# Patient Record
Sex: FEMALE | Race: WHITE | Employment: UNEMPLOYED | URBAN - METROPOLITAN AREA
[De-identification: names, ages, dates, MRNs, and addresses within clinical notes are randomized per-mention and may not be internally consistent; named-entity substitution may affect disease eponyms.]

---

## 2016-07-20 LAB
ANTIBODY SCREEN, EXTERNAL: NEGATIVE
CHLAMYDIA, EXTERNAL: NEGATIVE
HBSAG, EXTERNAL: NEGATIVE
HIV, EXTERNAL: NEGATIVE
N. GONORRHEA, EXTERNAL: NEGATIVE
RPR, EXTERNAL: NORMAL
RUBELLA, EXTERNAL: NORMAL
TYPE, ABO & RH, EXTERNAL: NORMAL

## 2017-01-31 LAB — GRBS, EXTERNAL: NEGATIVE

## 2017-02-26 ENCOUNTER — ANESTHESIA (OUTPATIENT)
Dept: LABOR AND DELIVERY | Age: 27
DRG: 560 | End: 2017-02-26
Payer: MEDICAID

## 2017-02-26 ENCOUNTER — HOSPITAL ENCOUNTER (INPATIENT)
Age: 27
LOS: 3 days | Discharge: HOME | DRG: 560 | End: 2017-03-01
Attending: OBSTETRICS & GYNECOLOGY | Admitting: OBSTETRICS & GYNECOLOGY
Payer: MEDICAID

## 2017-02-26 ENCOUNTER — ANESTHESIA EVENT (OUTPATIENT)
Dept: LABOR AND DELIVERY | Age: 27
DRG: 560 | End: 2017-02-26
Payer: MEDICAID

## 2017-02-26 PROBLEM — Z33.1 IUP (INTRAUTERINE PREGNANCY), INCIDENTAL: Status: ACTIVE | Noted: 2017-02-26

## 2017-02-26 LAB
ALBUMIN SERPL BCP-MCNC: 2 G/DL (ref 3.4–5)
ALBUMIN SERPL BCP-MCNC: 2.1 G/DL (ref 3.4–5)
ALBUMIN/GLOB SERPL: 0.5 {RATIO} (ref 0.8–1.7)
ALBUMIN/GLOB SERPL: 0.6 {RATIO} (ref 0.8–1.7)
ALP SERPL-CCNC: 235 U/L (ref 45–117)
ALP SERPL-CCNC: 238 U/L (ref 45–117)
ALT SERPL-CCNC: 36 U/L (ref 13–56)
ALT SERPL-CCNC: 37 U/L (ref 13–56)
ANION GAP BLD CALC-SCNC: 14 MMOL/L (ref 3–18)
ANION GAP BLD CALC-SCNC: 16 MMOL/L (ref 3–18)
APPEARANCE UR: ABNORMAL
AST SERPL W P-5'-P-CCNC: 42 U/L (ref 15–37)
AST SERPL W P-5'-P-CCNC: 42 U/L (ref 15–37)
BILIRUB SERPL-MCNC: 0.2 MG/DL (ref 0.2–1)
BILIRUB SERPL-MCNC: 0.2 MG/DL (ref 0.2–1)
BILIRUB UR QL: NEGATIVE
BUN SERPL-MCNC: 27 MG/DL (ref 7–18)
BUN SERPL-MCNC: 27 MG/DL (ref 7–18)
BUN/CREAT SERPL: 13 (ref 12–20)
BUN/CREAT SERPL: 13 (ref 12–20)
CALCIUM SERPL-MCNC: 8.8 MG/DL (ref 8.5–10.1)
CALCIUM SERPL-MCNC: 9.5 MG/DL (ref 8.5–10.1)
CHLORIDE SERPL-SCNC: 106 MMOL/L (ref 100–108)
CHLORIDE SERPL-SCNC: 107 MMOL/L (ref 100–108)
CO2 SERPL-SCNC: 17 MMOL/L (ref 21–32)
CO2 SERPL-SCNC: 18 MMOL/L (ref 21–32)
COLOR UR: YELLOW
CREAT SERPL-MCNC: 2.09 MG/DL (ref 0.6–1.3)
CREAT SERPL-MCNC: 2.11 MG/DL (ref 0.6–1.3)
ERYTHROCYTE [DISTWIDTH] IN BLOOD BY AUTOMATED COUNT: 13.5 % (ref 11.6–14.5)
ERYTHROCYTE [DISTWIDTH] IN BLOOD BY AUTOMATED COUNT: 13.6 % (ref 11.6–14.5)
GLOBULIN SER CALC-MCNC: 3.8 G/DL (ref 2–4)
GLOBULIN SER CALC-MCNC: 3.9 G/DL (ref 2–4)
GLUCOSE SERPL-MCNC: 64 MG/DL (ref 74–99)
GLUCOSE SERPL-MCNC: 73 MG/DL (ref 74–99)
GLUCOSE UR QL STRIP.AUTO: NEGATIVE MG/DL
HCT VFR BLD AUTO: 26.1 % (ref 35–45)
HCT VFR BLD AUTO: 26.4 % (ref 35–45)
HGB BLD-MCNC: 8.9 G/DL (ref 12–16)
HGB BLD-MCNC: 8.9 G/DL (ref 12–16)
KETONES UR-MCNC: NEGATIVE MG/DL
LDH SERPL L TO P-CCNC: 156 U/L (ref 81–234)
LDH SERPL L TO P-CCNC: 160 U/L (ref 81–234)
LEUKOCYTE ESTERASE UR QL STRIP: ABNORMAL
MCH RBC QN AUTO: 31 PG (ref 24–34)
MCH RBC QN AUTO: 31 PG (ref 24–34)
MCHC RBC AUTO-ENTMCNC: 33.7 G/DL (ref 31–37)
MCHC RBC AUTO-ENTMCNC: 34.1 G/DL (ref 31–37)
MCV RBC AUTO: 90.9 FL (ref 74–97)
MCV RBC AUTO: 92 FL (ref 74–97)
NITRITE UR QL: NEGATIVE
PH UR: 6 [PH] (ref 5–8)
PLATELET # BLD AUTO: 251 K/UL (ref 135–420)
PLATELET # BLD AUTO: 251 K/UL (ref 135–420)
PMV BLD AUTO: 10.2 FL (ref 9.2–11.8)
PMV BLD AUTO: 10.4 FL (ref 9.2–11.8)
POTASSIUM SERPL-SCNC: 3.1 MMOL/L (ref 3.5–5.5)
POTASSIUM SERPL-SCNC: 3.3 MMOL/L (ref 3.5–5.5)
PROT SERPL-MCNC: 5.9 G/DL (ref 6.4–8.2)
PROT SERPL-MCNC: 5.9 G/DL (ref 6.4–8.2)
PROT UR QL: 30 MG/DL
RBC # BLD AUTO: 2.87 M/UL (ref 4.2–5.3)
RBC # BLD AUTO: 2.87 M/UL (ref 4.2–5.3)
RBC # UR STRIP: ABNORMAL /UL
SODIUM SERPL-SCNC: 139 MMOL/L (ref 136–145)
SODIUM SERPL-SCNC: 139 MMOL/L (ref 136–145)
SP GR UR: 1.01 (ref 1–1.03)
URATE SERPL-MCNC: 8.1 MG/DL (ref 2.6–7.2)
URATE SERPL-MCNC: 8.4 MG/DL (ref 2.6–7.2)
UROBILINOGEN UR QL: 0.2 EU/DL (ref 0.2–1)
WBC # BLD AUTO: 13.1 K/UL (ref 4.6–13.2)
WBC # BLD AUTO: 13.4 K/UL (ref 4.6–13.2)

## 2017-02-26 PROCEDURE — 83615 LACTATE (LD) (LDH) ENZYME: CPT

## 2017-02-26 PROCEDURE — 74011000250 HC RX REV CODE- 250

## 2017-02-26 PROCEDURE — 65270000029 HC RM PRIVATE

## 2017-02-26 PROCEDURE — 75410000002 HC LABOR FEE PER 1 HR

## 2017-02-26 PROCEDURE — 36415 COLL VENOUS BLD VENIPUNCTURE: CPT

## 2017-02-26 PROCEDURE — 77030007879 HC KT SPN EPDRL TELE -B: Performed by: ANESTHESIOLOGY

## 2017-02-26 PROCEDURE — 00HU33Z INSERTION OF INFUSION DEVICE INTO SPINAL CANAL, PERCUTANEOUS APPROACH: ICD-10-PCS | Performed by: ANESTHESIOLOGY

## 2017-02-26 PROCEDURE — 85027 COMPLETE CBC AUTOMATED: CPT

## 2017-02-26 PROCEDURE — 84550 ASSAY OF BLOOD/URIC ACID: CPT

## 2017-02-26 PROCEDURE — 80053 COMPREHEN METABOLIC PANEL: CPT

## 2017-02-26 PROCEDURE — 74011250636 HC RX REV CODE- 250/636: Performed by: ADVANCED PRACTICE MIDWIFE

## 2017-02-26 PROCEDURE — 74011000250 HC RX REV CODE- 250: Performed by: ADVANCED PRACTICE MIDWIFE

## 2017-02-26 PROCEDURE — 74011250636 HC RX REV CODE- 250/636

## 2017-02-26 PROCEDURE — 74011250636 HC RX REV CODE- 250/636: Performed by: ANESTHESIOLOGY

## 2017-02-26 PROCEDURE — 77030010547 HC BG URIN W/UMETER -A

## 2017-02-26 PROCEDURE — 81003 URINALYSIS AUTO W/O SCOPE: CPT

## 2017-02-26 PROCEDURE — 77030034849

## 2017-02-26 RX ORDER — MAGNESIUM SULFATE HEPTAHYDRATE 40 MG/ML
2 INJECTION, SOLUTION INTRAVENOUS
Status: DISCONTINUED | OUTPATIENT
Start: 2017-02-26 | End: 2017-02-26

## 2017-02-26 RX ORDER — FOLIC ACID 1 MG/1
TABLET ORAL DAILY
COMMUNITY

## 2017-02-26 RX ORDER — CALCIUM GLUCONATE 94 MG/ML
1 INJECTION, SOLUTION INTRAVENOUS AS NEEDED
Status: DISCONTINUED | OUTPATIENT
Start: 2017-02-26 | End: 2017-03-01 | Stop reason: HOSPADM

## 2017-02-26 RX ORDER — SODIUM CHLORIDE 0.9 % (FLUSH) 0.9 %
5-10 SYRINGE (ML) INJECTION AS NEEDED
Status: DISCONTINUED | OUTPATIENT
Start: 2017-02-26 | End: 2017-02-27

## 2017-02-26 RX ORDER — ACETAMINOPHEN 10 MG/ML
1000 INJECTION, SOLUTION INTRAVENOUS ONCE
Status: COMPLETED | OUTPATIENT
Start: 2017-02-26 | End: 2017-02-26

## 2017-02-26 RX ORDER — NALOXONE HYDROCHLORIDE 0.4 MG/ML
0.2 INJECTION, SOLUTION INTRAMUSCULAR; INTRAVENOUS; SUBCUTANEOUS AS NEEDED
Status: DISCONTINUED | OUTPATIENT
Start: 2017-02-26 | End: 2017-02-27

## 2017-02-26 RX ORDER — FENTANYL CITRATE 50 UG/ML
INJECTION, SOLUTION INTRAMUSCULAR; INTRAVENOUS
Status: DISCONTINUED
Start: 2017-02-26 | End: 2017-02-27

## 2017-02-26 RX ORDER — SODIUM CHLORIDE 0.9 % (FLUSH) 0.9 %
5-10 SYRINGE (ML) INJECTION EVERY 8 HOURS
Status: DISCONTINUED | OUTPATIENT
Start: 2017-02-26 | End: 2017-02-27

## 2017-02-26 RX ORDER — OXYTOCIN/RINGER'S LACTATE 20/1000 ML
125 PLASTIC BAG, INJECTION (ML) INTRAVENOUS CONTINUOUS
Status: DISCONTINUED | OUTPATIENT
Start: 2017-02-26 | End: 2017-02-27

## 2017-02-26 RX ORDER — ROPIVACAINE HYDROCHLORIDE 2 MG/ML
INJECTION, SOLUTION EPIDURAL; INFILTRATION; PERINEURAL AS NEEDED
Status: DISCONTINUED | OUTPATIENT
Start: 2017-02-26 | End: 2017-02-27 | Stop reason: HOSPADM

## 2017-02-26 RX ORDER — FAMOTIDINE 20 MG/1
20 TABLET, FILM COATED ORAL 2 TIMES DAILY
COMMUNITY

## 2017-02-26 RX ORDER — NALBUPHINE HYDROCHLORIDE 10 MG/ML
10 INJECTION, SOLUTION INTRAMUSCULAR; INTRAVENOUS; SUBCUTANEOUS
Status: DISCONTINUED | OUTPATIENT
Start: 2017-02-26 | End: 2017-02-27

## 2017-02-26 RX ORDER — METHYLERGONOVINE MALEATE 0.2 MG/ML
0.2 INJECTION INTRAVENOUS AS NEEDED
Status: DISCONTINUED | OUTPATIENT
Start: 2017-02-26 | End: 2017-02-27

## 2017-02-26 RX ORDER — HYDROMORPHONE HYDROCHLORIDE 1 MG/ML
1 INJECTION, SOLUTION INTRAMUSCULAR; INTRAVENOUS; SUBCUTANEOUS
Status: DISCONTINUED | OUTPATIENT
Start: 2017-02-26 | End: 2017-02-27

## 2017-02-26 RX ORDER — LIDOCAINE HYDROCHLORIDE 20 MG/ML
INJECTION, SOLUTION EPIDURAL; INFILTRATION; INTRACAUDAL; PERINEURAL AS NEEDED
Status: DISCONTINUED | OUTPATIENT
Start: 2017-02-26 | End: 2017-02-27 | Stop reason: HOSPADM

## 2017-02-26 RX ORDER — BUTORPHANOL TARTRATE 2 MG/ML
2 INJECTION INTRAMUSCULAR; INTRAVENOUS
Status: DISCONTINUED | OUTPATIENT
Start: 2017-02-26 | End: 2017-02-27

## 2017-02-26 RX ORDER — LIDOCAINE HYDROCHLORIDE 10 MG/ML
20 INJECTION, SOLUTION EPIDURAL; INFILTRATION; INTRACAUDAL; PERINEURAL AS NEEDED
Status: DISCONTINUED | OUTPATIENT
Start: 2017-02-26 | End: 2017-02-27

## 2017-02-26 RX ORDER — FENTANYL/ROPIVACAINE/NS/PF 2MCG/ML-.1
1-15 PLASTIC BAG, INJECTION (ML) EPIDURAL
Status: DISCONTINUED | OUTPATIENT
Start: 2017-02-26 | End: 2017-02-27

## 2017-02-26 RX ORDER — NALBUPHINE HYDROCHLORIDE 10 MG/ML
5 INJECTION, SOLUTION INTRAMUSCULAR; INTRAVENOUS; SUBCUTANEOUS ONCE
Status: COMPLETED | OUTPATIENT
Start: 2017-02-27 | End: 2017-02-26

## 2017-02-26 RX ORDER — MINERAL OIL
30 OIL (ML) ORAL AS NEEDED
Status: COMPLETED | OUTPATIENT
Start: 2017-02-26 | End: 2017-02-27

## 2017-02-26 RX ORDER — TERBUTALINE SULFATE 1 MG/ML
0.25 INJECTION SUBCUTANEOUS
Status: DISCONTINUED | OUTPATIENT
Start: 2017-02-26 | End: 2017-02-27

## 2017-02-26 RX ORDER — DIPHENHYDRAMINE HYDROCHLORIDE 50 MG/ML
12.5 INJECTION, SOLUTION INTRAMUSCULAR; INTRAVENOUS
Status: DISCONTINUED | OUTPATIENT
Start: 2017-02-26 | End: 2017-03-01 | Stop reason: HOSPADM

## 2017-02-26 RX ORDER — MAGNESIUM SULFATE HEPTAHYDRATE 40 MG/ML
2 INJECTION, SOLUTION INTRAVENOUS
Status: COMPLETED | OUTPATIENT
Start: 2017-02-26 | End: 2017-02-27

## 2017-02-26 RX ORDER — OXYTOCIN IN 5 % DEXTROSE 30/500 ML
.5-2 PLASTIC BAG, INJECTION (ML) INTRAVENOUS
Status: DISCONTINUED | OUTPATIENT
Start: 2017-02-26 | End: 2017-02-27

## 2017-02-26 RX ORDER — LIDOCAINE HYDROCHLORIDE AND EPINEPHRINE 15; 5 MG/ML; UG/ML
INJECTION, SOLUTION EPIDURAL AS NEEDED
Status: DISCONTINUED | OUTPATIENT
Start: 2017-02-26 | End: 2017-02-27 | Stop reason: HOSPADM

## 2017-02-26 RX ORDER — FENTANYL CITRATE 50 UG/ML
100 INJECTION, SOLUTION INTRAMUSCULAR; INTRAVENOUS ONCE
Status: DISCONTINUED | OUTPATIENT
Start: 2017-02-26 | End: 2017-02-27

## 2017-02-26 RX ORDER — LAMOTRIGINE 100 MG/1
100 TABLET ORAL DAILY
COMMUNITY

## 2017-02-26 RX ORDER — FENTANYL/ROPIVACAINE/NS/PF 2MCG/ML-.1
PLASTIC BAG, INJECTION (ML) EPIDURAL
Status: DISCONTINUED
Start: 2017-02-26 | End: 2017-02-27

## 2017-02-26 RX ORDER — SODIUM CHLORIDE, SODIUM LACTATE, POTASSIUM CHLORIDE, CALCIUM CHLORIDE 600; 310; 30; 20 MG/100ML; MG/100ML; MG/100ML; MG/100ML
75 INJECTION, SOLUTION INTRAVENOUS CONTINUOUS
Status: DISCONTINUED | OUTPATIENT
Start: 2017-02-26 | End: 2017-02-28 | Stop reason: HOSPADM

## 2017-02-26 RX ORDER — FENTANYL CITRATE 50 UG/ML
INJECTION, SOLUTION INTRAMUSCULAR; INTRAVENOUS AS NEEDED
Status: DISCONTINUED | OUTPATIENT
Start: 2017-02-26 | End: 2017-02-27 | Stop reason: HOSPADM

## 2017-02-26 RX ORDER — LAMOTRIGINE 200 MG/1
TABLET ORAL 2 TIMES DAILY
COMMUNITY

## 2017-02-26 RX ORDER — OXYTOCIN/RINGER'S LACTATE 20/1000 ML
500 PLASTIC BAG, INJECTION (ML) INTRAVENOUS ONCE
Status: ACTIVE | OUTPATIENT
Start: 2017-02-26 | End: 2017-02-27

## 2017-02-26 RX ADMIN — FAMOTIDINE 20 MG: 10 INJECTION, SOLUTION INTRAVENOUS at 20:05

## 2017-02-26 RX ADMIN — SODIUM CHLORIDE, SODIUM LACTATE, POTASSIUM CHLORIDE, AND CALCIUM CHLORIDE 125 ML/HR: 600; 310; 30; 20 INJECTION, SOLUTION INTRAVENOUS at 19:40

## 2017-02-26 RX ADMIN — FENTANYL CITRATE 100 MCG: 50 INJECTION, SOLUTION INTRAMUSCULAR; INTRAVENOUS at 22:02

## 2017-02-26 RX ADMIN — Medication 2 MILLI-UNITS/MIN: at 20:39

## 2017-02-26 RX ADMIN — LIDOCAINE HYDROCHLORIDE AND EPINEPHRINE 2 ML: 15; 5 INJECTION, SOLUTION EPIDURAL at 22:02

## 2017-02-26 RX ADMIN — SODIUM CHLORIDE, SODIUM LACTATE, POTASSIUM CHLORIDE, AND CALCIUM CHLORIDE 1000 ML: 600; 310; 30; 20 INJECTION, SOLUTION INTRAVENOUS at 21:10

## 2017-02-26 RX ADMIN — LIDOCAINE HYDROCHLORIDE 5 ML: 20 INJECTION, SOLUTION EPIDURAL; INFILTRATION; INTRACAUDAL; PERINEURAL at 21:59

## 2017-02-26 RX ADMIN — NALBUPHINE HYDROCHLORIDE 5 MG: 10 INJECTION, SOLUTION INTRAMUSCULAR; INTRAVENOUS; SUBCUTANEOUS at 23:49

## 2017-02-26 RX ADMIN — SODIUM CHLORIDE, SODIUM LACTATE, POTASSIUM CHLORIDE, AND CALCIUM CHLORIDE 75 ML/HR: 600; 310; 30; 20 INJECTION, SOLUTION INTRAVENOUS at 23:24

## 2017-02-26 RX ADMIN — Medication 2 G/HR: at 23:24

## 2017-02-26 RX ADMIN — SODIUM CHLORIDE, SODIUM LACTATE, POTASSIUM CHLORIDE, AND CALCIUM CHLORIDE 125 ML/HR: 600; 310; 30; 20 INJECTION, SOLUTION INTRAVENOUS at 22:18

## 2017-02-26 RX ADMIN — ACETAMINOPHEN 1000 MG: 10 INJECTION, SOLUTION INTRAVENOUS at 20:47

## 2017-02-26 RX ADMIN — ROPIVACAINE HYDROCHLORIDE 5 ML: 2 INJECTION, SOLUTION EPIDURAL; INFILTRATION; PERINEURAL at 22:02

## 2017-02-26 RX ADMIN — Medication 12 ML/HR: at 22:19

## 2017-02-26 RX ADMIN — DIPHENHYDRAMINE HYDROCHLORIDE 12.5 MG: 50 INJECTION, SOLUTION INTRAMUSCULAR; INTRAVENOUS at 22:56

## 2017-02-26 RX ADMIN — MAGNESIUM SULFATE HEPTAHYDRATE 2 G: 40 INJECTION, SOLUTION INTRAVENOUS at 23:11

## 2017-02-26 RX ADMIN — MAGNESIUM SULFATE HEPTAHYDRATE 2 G: 40 INJECTION, SOLUTION INTRAVENOUS at 23:01

## 2017-02-26 RX ADMIN — LIDOCAINE HYDROCHLORIDE AND EPINEPHRINE 3 ML: 15; 5 INJECTION, SOLUTION EPIDURAL at 22:01

## 2017-02-26 NOTE — ROUTINE PROCESS
Patient states she has not been able to eat over the past 2 days due to pain that she describes as sharp and constant rating it at 8/10 over the past 2 days that is in her mid chest.  Patient states she has a history of heart burn/ indigestion and has tried pepcid and tums with no relief. Patient states her symptoms worsen with eating and even trying to drink. ANOOP Penn given report of patient's complaints, JULIA reviewed strip and vital signs and ordered labs. 1830:  ANOOP Penn at bedside assessing patient. SVE per JULIA 5-6/90/0.      1900:  ANOOP Villegas CNM reviewed labs and order received to admit patient. 1920: Bedside and Verbal shift change report given to Marcy Levine RN (oncoming nurse) by Olvin Ochoa RN   (offgoing nurse). Report included the following information SBAR, MAR and Recent Results. Alarm parameters reviewed and opportunities for questions provided.

## 2017-02-26 NOTE — IP AVS SNAPSHOT
Connor Davidson 
 
 
 [address] [facility] [address] 
[phone] Patient: Emerson Pritchett MRN: [id number] :1990 You are allergic to the following No active allergies Recent Documentation Height  
  
  
  
  
  
 1.676 m Emergency Contacts Name Discharge Info Relation Home Work Mobile Iram Cano N/A  AT THIS TIME [6] About your hospitalization You were admitted on:  2017 You last received care in the:  [address] [place] [address] You were discharged on:  2017 Unit phone number:  [phone] Why you were hospitalized Your primary diagnosis was:  Not on File Your diagnoses also included:  Iup (Intrauterine Pregnancy), Incidental  
  
  
 
  
  
Providers Seen During Your Hospitalizations Provider Role Specialty Primary office phone Temitope Case MD Attending Provider Obstetrics & Gynecology [phone] Your Primary Care Physician (PCP) Primary Care Physician Office Phone Office Fax [facility] [phone] Follow-up Information Follow up With Details Comments Contact Info Bhavana Tomlinson,  [address] 
[phone] Current Discharge Medication List  
  
START taking these medications Dose & Instructions Dispensing Information Comments Morning Noon Evening Bedtime  
 ibuprofen 800 mg tablet Commonly known as:  MOTRIN Your next dose is: Today, Tomorrow Other:  _________ Dose:  800 mg Take 1 Tab by mouth every eight (8) hours as needed. Quantity:  40 Tab Refills:  0 CONTINUE these medications which have NOT CHANGED Dose & Instructions Dispensing Information Comments Morning Noon Evening Bedtime  
 folic acid 1 mg tablet Commonly known as:  [employer]  
   
 Your next dose is: Today, Tomorrow Other:  _________ Take  by mouth daily. Refills:  0  
     
   
   
   
  
 * LaMICtal 100 mg tablet Generic drug:  lamoTRIgine Your next dose is: Today, Tomorrow Other:  _________ Dose:  100 mg Take 100 mg by mouth daily. Refills:  0  
     
   
   
   
  
 * LaMICtal 200 mg tablet Generic drug:  lamoTRIgine Your next dose is: Today, Tomorrow Other:  _________ Take  by mouth two (2) times a day. Refills:  0 PEPCID 20 mg tablet Generic drug:  famotidine Your next dose is: Today, Tomorrow Other:  _________ Dose:  20 mg Take 20 mg by mouth two (2) times a day. Indications: HEARTBURN Refills:  0  
     
   
   
   
  
 * Notice: This list has 2 medication(s) that are the same as other medications prescribed for you. Read the directions carefully, and ask your doctor or other care provider to review them with you. Where to Get Your Medications Information on where to get these meds will be given to you by the nurse or doctor. ! Ask your nurse or doctor about these medications  
  ibuprofen 800 mg tablet Discharge Instructions POST DELIVERY DISCHARGE INSTRUCTIONS Name: Maximilian Cash YOB: 1990 Primary Diagnosis: Active Problems: 
  IUP (intrauterine pregnancy), incidental (2/26/2017) General:  
 
Diet/Diet Restrictions: 
Eight 8-ounce glasses of fluid daily (water, juices); avoid excessive caffeine intake. Meals/snacks as desired which are high in fiber and carbohydrates and low in fat and cholesterol. Physical Activity / Restrictions / Safety:  
 
Avoid heavy lifting, no more that 8 lbs. For 2-3 weeks; No driving for one week. Avoid intercourse 4-6 weeks, no douching or tampon use.  Check with obstetrician before starting or resuming an exercise program.    
 
 
 Discharge Instructions/Special Treatment/Home Care Needs:  
 
Continue prenatal vitamins. Continue to use squirt bottle with warm water on your episiotomy after each bathroom use until bleeding stops. Call your doctor for the following:  
 
Fever over 101 degrees by mouth. Vaginal bleeding heavier than a normal menstrual period or clot larger than a golf ball. Red streaks or increased swelling of legs, painful red streaks on your breast. 
Painful urination, constipation and increased pain or swelling or discharge with your incision. If you feel extremely anxious or overwhelmed. If you have thoughts of harming yourself and/or your baby. Pain Management:  
 
Pain Management:  
Take Acetaminophen (Tylenol) or Ibuprofen (Advil, Motrin), as directed for pain. Use a warm Sitz bath 3 times daily to relieve episiotomy or hemorrhoidal discomfort. Heating pad to  incision as needed. For hemorrhoidal discomfort, use Tucks and Anusol cream as needed and directed. Follow-Up Care:  
 
Call [facility]. to make appointment for follow up in 3-5 days. These are general instructions for a healthy lifestyle: No smoking/ No tobacco products/ Avoid exposure to second hand smoke Surgeon General's Warning:  Quitting smoking now greatly reduces serious risk to your health. Obesity, smoking, and sedentary lifestyle greatly increases your risk for illness A healthy diet, regular physical exercise & weight monitoring are important for maintaining a healthy lifestyle Recognize signs and symptoms of STROKE: 
 
F-face looks uneven A-arms unable to move or move unevenly S-speech slurred or non-existent T-time-call 911 as soon as signs and symptoms begin-DO NOT go Back to bed or wait to see if you get better-TIME IS BRAIN. 
 
 
 
***PLEASE CALL YOUR PHYSICIAN TO SCHEDULE YOUR FOLLOW UP APPOINTMENT*** MyChart Activation Thank you for requesting access to CRMnext. Please follow the instructions below to securely access and download your online medical record. CRMnext allows you to send messages to your doctor, view your test results, renew your prescriptions, schedule appointments, and more. How Do I Sign Up? 1. In your internet browser, go to [email or web address]. 2. Click on the First Time User? Click Here link in the Sign In box. You will see the New Member Sign Up page. 3. Enter your CRMnext Access Code exactly as it appears below. You will not need to use this code after youve completed the sign-up process. If you do not sign up before the expiration date, you must request a new code. CRMnext Access Code: [email or web address] Expires: 2017  7:30 PM (This is the date your CRMnext access code will ) 4. Enter the last four digits of your Social Security Number (xxxx) and Date of Birth (mm/dd/yyyy) as indicated and click Submit. You will be taken to the next sign-up page. 5. Create a CRMnext ID. This will be your CRMnext login ID and cannot be changed, so think of one that is secure and easy to remember. 6. Create a CRMnext password. You can change your password at any time. 7. Enter your Password Reset Question and Answer. This can be used at a later time if you forget your password. 8. Enter your e-mail address. You will receive e-mail notification when new information is available in [address] 9. Click Sign Up. You can now view and download portions of your medical record. 10. Click the Download Summary menu link to download a portable copy of your medical information. Additional Information If you have questions, please visit the Frequently Asked Questions section of the CRMnext website at [email or web address] Remember, CRMnext is NOT to be used for urgent needs. For medical emergencies, dial 911. Discharge Instructions Attachments/References PREECLAMPSIA: POSTPARTUM: GENERAL INFO (ENGLISH) PARENTING: STRESS AND INFANTS (ENGLISH) POSTPARTUM CARE (ENGLISH) BREASTFEEDING (ENGLISH) FEEDING: : PEDIATRIC (ENGLISH) Discharge Orders None [facility] Announcement We are excited to announce that we are making your provider's discharge notes available to you in Bioclones. You will see these notes when they are completed and signed by the physician that discharged you from your recent hospital stay. If you have any questions or concerns about any information you see in Bioclones, please call the Health Information Department where you were seen or reach out to your Primary Care Provider for more information about your plan of care. Introducing [facility]! Kayla Brunson introduces Bioclones patient portal. Now you can access parts of your medical record, email your doctor's office, and request medication refills online. 1. In your internet browser, go to [email or web address] 2. Click on the First Time User? Click Here link in the Sign In box. You will see the New Member Sign Up page. 3. Enter your Bioclones Access Code exactly as it appears below. You will not need to use this code after youve completed the sign-up process. If you do not sign up before the expiration date, you must request a new code. · Bioclones Access Code: [email or web address] Expires: 2017  7:30 PM 
 
4. Enter the last four digits of your Social Security Number (xxxx) and Date of Birth (mm/dd/yyyy) as indicated and click Submit. You will be taken to the next sign-up page. 5. Create a Bioclones ID. This will be your Bioclones login ID and cannot be changed, so think of one that is secure and easy to remember. 6. Create a Bioclones password. You can change your password at any time. 7. Enter your Password Reset Question and Answer. This can be used at a later time if you forget your password. 8. Enter your e-mail address. You will receive e-mail notification when new information is available in [address] 9. Click Sign Up. You can now view and download portions of your medical record. 10. Click the Download Summary menu link to download a portable copy of your medical information. If you have questions, please visit the Frequently Asked Questions section of the Eventials website. Remember, Eventials is NOT to be used for urgent needs. For medical emergencies, dial 911. Now available from your iPhone and Android! General Information Please provide this summary of care documentation to your next provider. Patient Signature:  ____________________________________________________________ Date:  ____________________________________________________________  
  
Christopher Kimball Provider Signature:  ____________________________________________________________ Date:  ____________________________________________________________ More Information Learning About Preeclampsia After Childbirth What is preeclampsia? A woman with preeclampsia has blood pressure that is higher than usual. She may also have other serious symptoms. Preeclampsia can be dangerous. When it is severe, it can cause seizures (eclampsia) or liver or kidney damage. When the liver is affected, some women get HELLP syndrome, a blood-clotting and bleeding problem. HELLP can come on quickly and can be deadly. This is why your doctor checks you and your baby often. Preeclampsia usually occurs after 20 weeks of pregnancy. In rare cases, it is first noted right after childbirth. Most often, it starts near the end of pregnancy and goes away after childbirth. What are the symptoms? Mild preeclampsia usually doesn't cause symptoms. But preeclampsia can cause rapid weight gain and sudden swelling of the hands and face. Severe preeclampsia does cause symptoms. It can cause a very bad headache and trouble seeing and breathing. It also can cause belly pain. You may also urinate less than usual. 
If you have new preeclampsia symptoms after you go home from the hospital, call your doctor right away. What can you expect after you have had preeclampsia? In the hospital 
After the baby and the placenta are delivered, preeclampsia usually starts to improve. Most women get better in the first few days after childbirth. After having preeclampsia, you still have a risk of seizures for a day or more after childbirth. (Very rarely, seizures happen later on.) So your doctor may have you take magnesium sulfate for a day or more to prevent seizures. You may also take medicine to lower your blood pressure. When you go home Your blood pressure will most likely return to normal a few days after delivery. Your doctor will want to check your blood pressure sometime in the first week after you leave the hospital. 
Some women still have high blood pressure 6 weeks after childbirth. But most return to normal levels over the long term. · Take and record your blood pressure at home if your doctor tells you to. ¨ Learn the importance of the two measures of blood pressure (such as 120 over 80, or 120/80). The first number is the systolic pressure. This is the force of blood on the artery walls as the heart pumps. The second number is the diastolic pressure. This is the force of blood on the artery walls between heartbeats, when the heart is at rest. You have a choice of monitors to use. § Manual monitor: You pump up the cuff and use a stethoscope to listen for your pulse. § Electronic monitor: The cuff inflates, and a gauge shows your pulse rate. ¨ To take your blood pressure: § Ask your doctor to check your blood pressure monitor to be sure that it is accurate and that the cuff fits you.  Also ask your doctor to watch you use it, to make sure that you are using it right. § You should not eat, use tobacco products, or use medicine known to raise blood pressure (such as some nasal decongestant sprays) before you take your blood pressure. § Avoid taking your blood pressure if you have just exercised or are nervous or upset. Rest at least 15 minutes before you take your blood pressure. · Be safe with medicines. If you take medicine, take it exactly as prescribed. Call your doctor if you think you are having a problem with your medicine. · Do not smoke. Quitting smoking will help lower your blood pressure and improve your baby's growth and health. If you need help quitting, talk to your doctor about stop-smoking programs and medicines. These can increase your chances of quitting for good. · Eat a balanced and healthy diet that has lots of fruits and vegetables. Long-term health After you have had preeclampsia, you have a higher-than-average risk of heart disease, stroke, and kidney disease. This may be because the same things that cause preeclampsia also cause heart and kidney disease. To protect your health, work with your doctor on living a heart-healthy lifestyle and getting the checkups you need. Your doctor may also want you to check your blood pressure at home. Follow-up care is a key part of your treatment and safety. Be sure to make and go to all appointments, and call your doctor if you are having problems. It's also a good idea to know your test results and keep a list of the medicines you take. Where can you learn more? Go to http://helio-brando.info/. Enter E696 in the search box to learn more about \"Learning About Preeclampsia After Childbirth. \" 
Current as of: May 30, 2016 Content Version: 11.1 © 6062-0516 "[employer]", Incorporated.  Care instructions adapted under license by [employer] (which disclaims liability or warranty for this information). If you have questions about a medical condition or this instruction, always ask your healthcare professional. [address] any warranty or liability for your use of this information. Stress in Parents of Infants: Care Instructions Your Care Instructions Meeting the increased demands of being a new parent can be a big challenge. It is easy to get overtired and overwhelmed during the first weeks. What used to be a simple chore, such as buying groceries, is not so simple now. Plus, you have new chores, including feeding and changing your new baby. At the end of the day, you may be so tired that you feel like crying. Instead of looking forward to the next day, you may be dreading tomorrow. Like many new parents, you are burned out from the stress of having a new baby. Stress affects each of us differently, and the most effective ways to relieve it are different for each person. You can try different methods to find out which ones work best for you. As the weeks go by, you will begin to develop a rhythm with your baby. Tasks that now seem to take forever will become easier. Many women get the \"baby blues\" during the first few days after childbirth. If you are a new mother and the \"baby blues\" last more than a few days, call your doctor right away. Depression is a medical condition that requires treatment. Follow-up care is a key part of your treatment and safety. Be sure to make and go to all appointments, and call your doctor if you are having problems. It's also a good idea to know your test results and keep a list of the medicines you take. How can you care for yourself at home? · Be kind to yourself. Your new baby takes a lot of work, but he or she can give you a lot of pleasure too. Do not worry about housekeeping for a while. · Allow your friends to bring you meals or do chores. · Limit visitors to as few as you feel you can handle, or ask them not to visit for a while. Before they come, set a limit on how long they will stay. · Sleep when your baby sleeps. Even a short nap helps. · Find what triggers your stress, and avoid those things as much as you can. · If you breastfeed, learn how to collect and store some breast milk so your partner or  can feed the baby while you sleep. · Eat a balanced diet so you can keep up your energy. · Drink plenty of fluids throughout the day. · Avoid caffeine and alcohol. Caffeine is found in coffee, tea, cola drinks, chocolate, and other foods. · Limit medicines that can make you more tired, such as tranquilizers and cold and allergy medicines. · Get regular daily exercise, such as walks, to help improve your mood. Rest after you exercise. · Be honest with yourself and those who care about you. Tell them you are stressed and tired. · Talking to other new parents can help. Ask your doctor or child's doctor to suggest support groups for new parents. Hearing that someone else is having the same experiences you are can help a lot. · If you have the baby blues for more than a few days, call your doctor right away. When should you call for help? Call 911 anytime you think you may need emergency care. For example, call if: 
· You have thoughts of hurting yourself, your baby, or another person. Call your doctor now or seek immediate medical care if: 
· You are having trouble caring for yourself or your baby. Watch closely for changes in your health, and be sure to contact your doctor if you have any problems. Where can you learn more? Go to http://helio-brando.info/. Enter H142 in the search box to learn more about \"Stress in Parents of Infants: Care Instructions. \" Current as of: July 26, 2016 Content Version: 11.1 © 3719-2680 [employer].  Care instructions adapted under license by [address] (which disclaims liability or warranty for this information). If you have questions about a medical condition or this instruction, always ask your healthcare professional. [address] any warranty or liability for your use of this information. Postpartum: Care Instructions Your Care Instructions After childbirth (postpartum period), your body goes through many changes. Some of these changes happen over several weeks. In the hours after delivery, your body will begin to recover from childbirth while it prepares to breastfeed your . You may feel emotional during this time. Your hormones can shift your mood without warning for no clear reason. In the first couple of weeks after childbirth, many women have emotions that change from happy to sad. You may find it hard to sleep. You may cry a lot. This is called the \"baby blues. \" These overwhelming emotions often go away within a couple of days or weeks. But it's important to discuss your feelings with your doctor. It is easy to get too tired and overwhelmed during the first weeks after childbirth. Don't try to do too much. Get rest whenever you can, accept help from others, and eat well and drink plenty of fluids. About 4 to 6 weeks after your baby's birth, you will have a follow-up visit with your doctor. This visit is your time to talk to your doctor about anything you are concerned or curious about. Follow-up care is a key part of your treatment and safety. Be sure to make and go to all appointments, and call your doctor if you are having problems. It's also a good idea to know your test results and keep a list of the medicines you take. How can you care for yourself at home? · Sleep or rest when your baby sleeps. · Get help with household chores from family or friends, if you can. Do not try to do it all yourself. · If you have hemorrhoids or swelling or pain around the opening of your vagina, try using cold and heat. You can put ice or a cold pack on the area for 10 to 20 minutes at a time. Put a thin cloth between the ice and your skin. Also try sitting in a few inches of warm water (sitz bath) 3 times a day and after bowel movements. · Take pain medicines exactly as directed. ¨ If the doctor gave you a prescription medicine for pain, take it as prescribed. ¨ If you are not taking a prescription pain medicine, ask your doctor if you can take an over-the-counter medicine. · Eat more fiber to avoid constipation. Include foods such as whole-grain breads and cereals, raw vegetables, raw and dried fruits, and beans. · Drink plenty of fluids, enough so that your urine is light yellow or clear like water. If you have kidney, heart, or liver disease and have to limit fluids, talk with your doctor before you increase the amount of fluids you drink. · Do not rinse inside your vagina with fluids (douche). · If you have stitches, keep the area clean by pouring or spraying warm water over the area outside your vagina and anus after you use the toilet. · Keep a list of questions to bring to your postpartum visit. Your questions might be about: 
¨ Changes in your breasts, such as lumps or soreness. ¨ When to expect your menstrual period to start again. ¨ What form of birth control is best for you. ¨ Weight you have put on during the pregnancy. ¨ Exercise options. ¨ What foods and drinks are best for you, especially if you are breastfeeding. ¨ Problems you might be having with breastfeeding. ¨ When you can have sex. Some women may want to talk about lubricants for the vagina. ¨ Any feelings of sadness or restlessness that you are having. When should you call for help? Call 911 anytime you think you may need emergency care. For example, call if: 
· You have thoughts of harming yourself, your baby, or another person. · You passed out (lost consciousness). Call your doctor now or seek immediate medical care if: 
· Your vaginal bleeding seems to be getting heavier. · You are dizzy or lightheaded, or you feel like you may faint. · You have a fever. Watch closely for changes in your health, and be sure to contact your doctor if: 
· You have new or worse vaginal discharge. · You feel sad or depressed. · You are having problems with your breasts or breastfeeding. Where can you learn more? Go to http://helio-brando.info/. Enter F094 in the search box to learn more about \"Postpartum: Care Instructions. \" Current as of: May 30, 2016 Content Version: 11.1 © 2854-5562 [employer]. Care instructions adapted under license by [employer] (which disclaims liability or warranty for this information). If you have questions about a medical condition or this instruction, always ask your healthcare professional. [place] [address] any warranty or liability for your use of this information. Breastfeeding: Care Instructions Your Care Instructions Breastfeeding has many benefits. It may lower your baby's chances of getting an infection. It also may prevent your baby from having problems such as diabetes and high cholesterol later in life. Breastfeeding also helps you bond with your baby. The American Academy of Pediatrics recommends breastfeeding for at least a year. That may be very hard for many women to do, but breastfeeding even for a shorter period of time is a health benefit to you and your baby. In the first days after birth, your breasts make a thick, yellow liquid called colostrum. This liquid gives your baby nutrients and antibodies against infection. It is all that babies need in the first days after birth. Your breasts will fill with milk a few days after the birth. Breastfeeding is a skill that gets better with practice.  It is common to have some problems. Some women have sore or cracked nipples, blocked milk ducts, or a breast infection (mastitis). But if you feed your baby every 1 to 2 hours during the day and follow the tips on this sheet, you may not have these problems. You can treat these problems if they happen and continue breastfeeding. Follow-up care is a key part of your treatment and safety. Be sure to make and go to all appointments, and call your doctor if you are having problems. It's also a good idea to know your test results and keep a list of the medicines you take. How can you care for yourself at home? · Breastfeed your baby whenever he or she is hungry. In the first 2 weeks, your baby will feed about every 1 to 3 hours. This will help you keep up your supply of milk. · Put a bed pillow or a nursing pillow on your lap to support your arms and your baby. · Hold your baby in a comfortable position. ¨ You can hold your baby in several ways. One of the most common positions is the cradle hold. One arm supports your baby, with his or her head in the bend of your elbow. Your open hand supports your baby's bottom or back. Your baby's belly lies against yours. ¨ If you had your baby by , or , try the football hold. This position keeps your baby off your belly. Tuck your baby under your arm, with his or her body along the side you will be feeding on. Support your baby's upper body with your arm. With that hand you can control your baby's head to bring his or her mouth to your breast. 
¨ Try different positions with each feeding. If you are having problems, ask for help from your doctor or a lactation consultant. · To get your baby to latch on: 
¨ Support and narrow your breast with one hand using a \"U hold,\" with your thumb on the outer side of your breast and your fingers on the inner side.  You can also use a \"C hold,\" with all your fingers below the nipple and your thumb above it. Try the different holds to get the deepest latch for whichever breastfeeding position you use. Your other arm is behind your baby's back, with your hand supporting the base of the baby's head. Position your fingers and thumb to point toward your baby's ears. ¨ You can touch your baby's lower lip with your nipple to get your baby to open his or her mouth. Wait until your baby opens up really wide, like a big yawn. Then be sure to bring the baby quickly to your breastnot your breast to the baby. As you bring your baby toward your breast, use your other hand to support the breast and guide it into his or her mouth. ¨ Both the nipple and a large portion of the darker area around the nipple (areola) should be in the baby's mouth. The baby's lips should be flared outward, not folded in (inverted). ¨ Listen for a regular sucking and swallowing pattern while the baby is feeding. If you cannot see or hear a swallowing pattern, watch the baby's ears, which will wiggle slightly when the baby swallows. If the baby's nose appears to be blocked by your breast, tilt the baby's head back slightly, so just the edge of one nostril is clear for breathing. ¨ When your baby is latched, you can usually remove your hand from supporting your breast and bring it under your baby to cradle him or her. Now just relax and breastfeed your baby. · You will know that your baby is feeding well when: 
¨ His or her mouth covers a lot of the areola, and the lips are flared out. ¨ His or her chin and nose rest against your breast. 
¨ Sucking is deep and rhythmic, with short pauses. ¨ You are able to see and hear your baby swallowing. ¨ You do not feel pain in your nipple. · If your baby takes only one breast at a feeding, start the next feeding on the other breast. 
· Anytime you need to remove your baby from the breast, put one finger in the corner of his or her mouth.  Push your finger between your baby's gums to gently break the seal. If you do not break the tight seal before you remove your baby, your nipples can become sore, cracked, or bruised. · After feeding your baby, gently pat his or her back to let out any swallowed air. After your baby burps, offer the breast again, or offer the other breast. Sometimes a baby will want to keep feeding after being burped. When should you call for help? Call your doctor now or seek immediate medical care if: 
· You have symptoms of a breast infection, such as: 
¨ Increased pain, swelling, redness, or warmth around a breast. 
¨ Red streaks extending from the breast. 
¨ Pus draining from a breast. 
¨ A fever. · Your baby has no wet diapers for 6 hours. Watch closely for changes in your health, and be sure to contact your doctor if: 
· Your baby has trouble latching on to your breast. 
· You continue to have pain or discomfort when breastfeeding. · You have other questions or concerns. Where can you learn more? Go to http://helio-brando.info/. Enter P492 in the search box to learn more about \"Breastfeeding: Care Instructions. \" Current as of: May 30, 2016 Content Version: 11.1 © 7379-7255 [employer]. Care instructions adapted under license by [employer] (which disclaims liability or warranty for this information). If you have questions about a medical condition or this instruction, always ask your healthcare professional. [place] [address] any warranty or liability for your use of this information. Feeding Your [place]: Care Instructions Your Care Instructions Feeding a  is an important concern for parents. Experts recommend that newborns be fed on demand. This means that you breastfeed or bottle-feed your infant whenever he or she shows signs of hunger, rather than setting a strict schedule. Newborns follow their feelings of hunger. They eat when they are hungry and stop eating when they are full. Most experts also recommend breastfeeding for at least the first year. A common concern for parents is whether their baby is eating enough. Talk to your doctor if you are concerned about how much your baby is eating. Most newborns lose weight in the first several days after birth but regain it within a week or two. After 3weeks of age, your baby should continue to gain weight steadily. Follow-up care is a key part of your child's treatment and safety. Be sure to make and go to all appointments, and call your doctor if your child is having problems. It's also a good idea to know your child's test results and keep a list of the medicines your child takes. How can you care for your child at home? · Allow your baby to feed on demand. ¨ During the first 2 weeks, these feedings occur every 1 to 3 hours (about 8 to 12 feedings in a 24-hour period) for  babies. These early feedings may last only a few minutes. Over time, feeding sessions will become longer and may happen less often. ¨ Formula-fed babies may have slightly fewer feedings, about 6 to 10 every 24 hours. They will eat about 2 to 3 ounces every 3 to 4 hours during the first few weeks of life. ¨ By 2 months, most babies have a set feeding routine. But your baby's routine may change at times, such as during growth spurts when your baby may be hungry more often. · You may have to wake a sleepy baby to feed in the first few days after birth. · Do not give any milk other than breast milk or infant formula until your baby is 1 year of age. Cow's milk, goat's milk, and soy milk do not have the nutrients that very young babies need to grow and develop properly. Cow and goat milk are very hard for young babies to digest. 
· Ask your doctor about giving a vitamin D supplement starting within the first few days after birth. · If you choose to switch your baby from the breast to bottle-feeding, try these tips. ¨ Try letting your baby drink from a bottle. Slowly reduce the number of times you breastfeed each day. For a week, replace a breastfeeding with a bottle-feeding during one of your daily feeding times. ¨ Each week, choose one more breastfeeding time to replace or shorten. ¨ Offer the bottle before each breastfeeding. When should you call for help? Watch closely for changes in your child's health, and be sure to contact your doctor if: 
· You have questions about feeding your baby. · You are concerned that your baby is not eating enough. · You have trouble feeding your baby. Where can you learn more? Go to http://helio-brando.info/. Enter 615-291-6250 in the search box to learn more about \"Feeding Your : Care Instructions. \" Current as of: 2016 Content Version: 11.1 © 1293-7550 [employer]. Care instructions adapted under license by [employer] (which disclaims liability or warranty for this information). If you have questions about a medical condition or this instruction, always ask your healthcare professional. [place] [address] any warranty or liability for your use of this information.

## 2017-02-26 NOTE — ANESTHESIA PREPROCEDURE EVALUATION
Anesthetic History   No history of anesthetic complications            Review of Systems / Medical History  Patient summary reviewed, nursing notes reviewed and pertinent labs reviewed    Pulmonary  Within defined limits                 Neuro/Psych   Within defined limits  seizures: well controlled         Cardiovascular  Within defined limits                Exercise tolerance: >4 METS     GI/Hepatic/Renal  Within defined limits              Endo/Other  Within defined limits           Other Findings            Physical Exam    Airway  Mallampati: II  TM Distance: 4 - 6 cm  Neck ROM: normal range of motion        Cardiovascular    Rhythm: regular  Rate: normal         Dental  No notable dental hx       Pulmonary                 Abdominal  GI exam deferred       Other Findings            Anesthetic Plan    ASA: 3  Anesthesia type: epidural  Risks and benefits of epidural include but not limited to a headache (with risk of repair requiring blood patch), backache, bleeding, infection, nerve injury, paralysis, failure with need to replace, aand hemodynamic changes.              Anesthetic plan and risks discussed with: Patient

## 2017-02-27 LAB
ALBUMIN SERPL BCP-MCNC: 1.7 G/DL (ref 3.4–5)
ALBUMIN SERPL BCP-MCNC: 2.1 G/DL (ref 3.4–5)
ALBUMIN/GLOB SERPL: 0.5 {RATIO} (ref 0.8–1.7)
ALBUMIN/GLOB SERPL: 0.5 {RATIO} (ref 0.8–1.7)
ALP SERPL-CCNC: 212 U/L (ref 45–117)
ALP SERPL-CCNC: 254 U/L (ref 45–117)
ALT SERPL-CCNC: 29 U/L (ref 13–56)
ALT SERPL-CCNC: 38 U/L (ref 13–56)
ANION GAP BLD CALC-SCNC: 12 MMOL/L (ref 3–18)
ANION GAP BLD CALC-SCNC: 15 MMOL/L (ref 3–18)
AST SERPL W P-5'-P-CCNC: 34 U/L (ref 15–37)
AST SERPL W P-5'-P-CCNC: 44 U/L (ref 15–37)
BILIRUB DIRECT SERPL-MCNC: 0.1 MG/DL (ref 0–0.2)
BILIRUB SERPL-MCNC: 0.2 MG/DL (ref 0.2–1)
BILIRUB SERPL-MCNC: 0.2 MG/DL (ref 0.2–1)
BUN SERPL-MCNC: 19 MG/DL (ref 7–18)
BUN SERPL-MCNC: 25 MG/DL (ref 7–18)
BUN/CREAT SERPL: 12 (ref 12–20)
BUN/CREAT SERPL: 13 (ref 12–20)
CALCIUM SERPL-MCNC: 7.7 MG/DL (ref 8.5–10.1)
CALCIUM SERPL-MCNC: 8.8 MG/DL (ref 8.5–10.1)
CHLORIDE SERPL-SCNC: 105 MMOL/L (ref 100–108)
CHLORIDE SERPL-SCNC: 107 MMOL/L (ref 100–108)
CO2 SERPL-SCNC: 19 MMOL/L (ref 21–32)
CO2 SERPL-SCNC: 19 MMOL/L (ref 21–32)
CREAT SERPL-MCNC: 1.65 MG/DL (ref 0.6–1.3)
CREAT SERPL-MCNC: 1.97 MG/DL (ref 0.6–1.3)
ERYTHROCYTE [DISTWIDTH] IN BLOOD BY AUTOMATED COUNT: 13.3 % (ref 11.6–14.5)
ERYTHROCYTE [DISTWIDTH] IN BLOOD BY AUTOMATED COUNT: 13.5 % (ref 11.6–14.5)
GLOBULIN SER CALC-MCNC: 3.6 G/DL (ref 2–4)
GLOBULIN SER CALC-MCNC: 4.1 G/DL (ref 2–4)
GLUCOSE SERPL-MCNC: 112 MG/DL (ref 74–99)
GLUCOSE SERPL-MCNC: 80 MG/DL (ref 74–99)
HCT VFR BLD AUTO: 25.9 % (ref 35–45)
HCT VFR BLD AUTO: 29.3 % (ref 35–45)
HGB BLD-MCNC: 9 G/DL (ref 12–16)
HGB BLD-MCNC: 9.9 G/DL (ref 12–16)
LDH SERPL L TO P-CCNC: 217 U/L (ref 81–234)
MAGNESIUM SERPL-MCNC: 6.2 MG/DL (ref 1.8–2.4)
MAGNESIUM SERPL-MCNC: 6.7 MG/DL (ref 1.8–2.4)
MAGNESIUM SERPL-MCNC: 6.9 MG/DL (ref 1.8–2.4)
MAGNESIUM SERPL-MCNC: 7.7 MG/DL (ref 1.8–2.4)
MAGNESIUM SERPL-MCNC: 8 MG/DL (ref 1.8–2.4)
MCH RBC QN AUTO: 30.7 PG (ref 24–34)
MCH RBC QN AUTO: 31.3 PG (ref 24–34)
MCHC RBC AUTO-ENTMCNC: 33.8 G/DL (ref 31–37)
MCHC RBC AUTO-ENTMCNC: 34.7 G/DL (ref 31–37)
MCV RBC AUTO: 89.9 FL (ref 74–97)
MCV RBC AUTO: 91 FL (ref 74–97)
PLATELET # BLD AUTO: 230 K/UL (ref 135–420)
PLATELET # BLD AUTO: 248 K/UL (ref 135–420)
PMV BLD AUTO: 10.2 FL (ref 9.2–11.8)
PMV BLD AUTO: 9.7 FL (ref 9.2–11.8)
POTASSIUM SERPL-SCNC: 2.7 MMOL/L (ref 3.5–5.5)
POTASSIUM SERPL-SCNC: 3.7 MMOL/L (ref 3.5–5.5)
PROT SERPL-MCNC: 5.3 G/DL (ref 6.4–8.2)
PROT SERPL-MCNC: 6.2 G/DL (ref 6.4–8.2)
RBC # BLD AUTO: 2.88 M/UL (ref 4.2–5.3)
RBC # BLD AUTO: 3.22 M/UL (ref 4.2–5.3)
SODIUM SERPL-SCNC: 138 MMOL/L (ref 136–145)
SODIUM SERPL-SCNC: 139 MMOL/L (ref 136–145)
URATE SERPL-MCNC: 8 MG/DL (ref 2.6–7.2)
URATE SERPL-MCNC: 8.1 MG/DL (ref 2.6–7.2)
WBC # BLD AUTO: 16.7 K/UL (ref 4.6–13.2)
WBC # BLD AUTO: 19.2 K/UL (ref 4.6–13.2)

## 2017-02-27 PROCEDURE — 75410000002 HC LABOR FEE PER 1 HR

## 2017-02-27 PROCEDURE — 74011250636 HC RX REV CODE- 250/636: Performed by: NURSE PRACTITIONER

## 2017-02-27 PROCEDURE — 80076 HEPATIC FUNCTION PANEL: CPT | Performed by: NURSE PRACTITIONER

## 2017-02-27 PROCEDURE — 77010026065 HC OXYGEN MINIMUM MEDICAL AIR

## 2017-02-27 PROCEDURE — 83735 ASSAY OF MAGNESIUM: CPT

## 2017-02-27 PROCEDURE — 74011000250 HC RX REV CODE- 250: Performed by: NURSE PRACTITIONER

## 2017-02-27 PROCEDURE — 75410000003 HC RECOV DEL/VAG/CSECN EA 0.5 HR

## 2017-02-27 PROCEDURE — 83735 ASSAY OF MAGNESIUM: CPT | Performed by: NURSE PRACTITIONER

## 2017-02-27 PROCEDURE — 65270000029 HC RM PRIVATE

## 2017-02-27 PROCEDURE — 88307 TISSUE EXAM BY PATHOLOGIST: CPT | Performed by: OBSTETRICS & GYNECOLOGY

## 2017-02-27 PROCEDURE — 84550 ASSAY OF BLOOD/URIC ACID: CPT

## 2017-02-27 PROCEDURE — 10907ZC DRAINAGE OF AMNIOTIC FLUID, THERAPEUTIC FROM PRODUCTS OF CONCEPTION, VIA NATURAL OR ARTIFICIAL OPENING: ICD-10-PCS | Performed by: OBSTETRICS & GYNECOLOGY

## 2017-02-27 PROCEDURE — 77030034849

## 2017-02-27 PROCEDURE — 83735 ASSAY OF MAGNESIUM: CPT | Performed by: ADVANCED PRACTICE MIDWIFE

## 2017-02-27 PROCEDURE — 76060000078 HC EPIDURAL ANESTHESIA

## 2017-02-27 PROCEDURE — 80048 BASIC METABOLIC PNL TOTAL CA: CPT | Performed by: NURSE PRACTITIONER

## 2017-02-27 PROCEDURE — 74011250637 HC RX REV CODE- 250/637: Performed by: NURSE PRACTITIONER

## 2017-02-27 PROCEDURE — 74011250637 HC RX REV CODE- 250/637: Performed by: ADVANCED PRACTICE MIDWIFE

## 2017-02-27 PROCEDURE — 36415 COLL VENOUS BLD VENIPUNCTURE: CPT

## 2017-02-27 PROCEDURE — 83615 LACTATE (LD) (LDH) ENZYME: CPT

## 2017-02-27 PROCEDURE — 75410000000 HC DELIVERY VAGINAL/SINGLE

## 2017-02-27 PROCEDURE — 74011250636 HC RX REV CODE- 250/636: Performed by: ADVANCED PRACTICE MIDWIFE

## 2017-02-27 PROCEDURE — 77030010547 HC BG URIN W/UMETER -A

## 2017-02-27 PROCEDURE — 85027 COMPLETE CBC AUTOMATED: CPT

## 2017-02-27 PROCEDURE — 74011250636 HC RX REV CODE- 250/636: Performed by: ANESTHESIOLOGY

## 2017-02-27 PROCEDURE — 74011250636 HC RX REV CODE- 250/636: Performed by: OBSTETRICS & GYNECOLOGY

## 2017-02-27 PROCEDURE — 80053 COMPREHEN METABOLIC PANEL: CPT

## 2017-02-27 RX ORDER — ACETAMINOPHEN 325 MG/1
650 TABLET ORAL
Status: DISCONTINUED | OUTPATIENT
Start: 2017-02-27 | End: 2017-03-01 | Stop reason: HOSPADM

## 2017-02-27 RX ORDER — ZOLPIDEM TARTRATE 5 MG/1
5 TABLET ORAL
Status: DISCONTINUED | OUTPATIENT
Start: 2017-02-27 | End: 2017-03-01 | Stop reason: HOSPADM

## 2017-02-27 RX ORDER — OXYCODONE AND ACETAMINOPHEN 5; 325 MG/1; MG/1
2 TABLET ORAL
Status: DISCONTINUED | OUTPATIENT
Start: 2017-02-27 | End: 2017-03-01 | Stop reason: HOSPADM

## 2017-02-27 RX ORDER — IBUPROFEN 400 MG/1
800 TABLET ORAL
Status: DISCONTINUED | OUTPATIENT
Start: 2017-02-27 | End: 2017-03-01 | Stop reason: HOSPADM

## 2017-02-27 RX ORDER — KETOROLAC TROMETHAMINE 30 MG/ML
30 INJECTION, SOLUTION INTRAMUSCULAR; INTRAVENOUS EVERY 6 HOURS
Status: DISCONTINUED | OUTPATIENT
Start: 2017-02-27 | End: 2017-02-28

## 2017-02-27 RX ORDER — ONDANSETRON 2 MG/ML
4 INJECTION INTRAMUSCULAR; INTRAVENOUS
Status: DISCONTINUED | OUTPATIENT
Start: 2017-02-27 | End: 2017-02-27

## 2017-02-27 RX ORDER — POTASSIUM CHLORIDE 7.45 MG/ML
10 INJECTION INTRAVENOUS ONCE
Status: COMPLETED | OUTPATIENT
Start: 2017-02-27 | End: 2017-02-27

## 2017-02-27 RX ORDER — AMOXICILLIN 250 MG
1 CAPSULE ORAL
Status: DISCONTINUED | OUTPATIENT
Start: 2017-02-27 | End: 2017-03-01 | Stop reason: HOSPADM

## 2017-02-27 RX ORDER — PROMETHAZINE HYDROCHLORIDE 25 MG/ML
25 INJECTION, SOLUTION INTRAMUSCULAR; INTRAVENOUS
Status: DISCONTINUED | OUTPATIENT
Start: 2017-02-27 | End: 2017-03-01 | Stop reason: HOSPADM

## 2017-02-27 RX ORDER — POTASSIUM CHLORIDE 1.5 G/1.77G
20 POWDER, FOR SOLUTION ORAL 2 TIMES DAILY WITH MEALS
Status: DISCONTINUED | OUTPATIENT
Start: 2017-02-27 | End: 2017-02-27

## 2017-02-27 RX ORDER — ONDANSETRON 2 MG/ML
4 INJECTION INTRAMUSCULAR; INTRAVENOUS
Status: DISCONTINUED | OUTPATIENT
Start: 2017-02-27 | End: 2017-02-28

## 2017-02-27 RX ADMIN — Medication 12 ML/HR: at 04:54

## 2017-02-27 RX ADMIN — POTASSIUM CHLORIDE 10 MEQ: 10 INJECTION, SOLUTION INTRAVENOUS at 13:16

## 2017-02-27 RX ADMIN — Medication 2 G/HR: at 07:23

## 2017-02-27 RX ADMIN — POTASSIUM CHLORIDE 20 MEQ: 20 POWDER ORAL at 09:02

## 2017-02-27 RX ADMIN — KETOROLAC TROMETHAMINE 30 MG: 30 INJECTION, SOLUTION INTRAMUSCULAR at 22:40

## 2017-02-27 RX ADMIN — Medication 2 G/HR: at 11:54

## 2017-02-27 RX ADMIN — KETOROLAC TROMETHAMINE 30 MG: 30 INJECTION, SOLUTION INTRAMUSCULAR at 17:22

## 2017-02-27 RX ADMIN — Medication 30 ML: at 05:36

## 2017-02-27 RX ADMIN — ONDANSETRON 4 MG: 2 INJECTION INTRAMUSCULAR; INTRAVENOUS at 05:18

## 2017-02-27 RX ADMIN — POTASSIUM CHLORIDE 10 MEQ: 10 INJECTION, SOLUTION INTRAVENOUS at 11:54

## 2017-02-27 RX ADMIN — PROMETHAZINE HYDROCHLORIDE 25 MG: 25 INJECTION INTRAMUSCULAR; INTRAVENOUS at 10:02

## 2017-02-27 RX ADMIN — Medication 2 G/HR: at 06:20

## 2017-02-27 RX ADMIN — KETOROLAC TROMETHAMINE 30 MG: 30 INJECTION, SOLUTION INTRAMUSCULAR at 11:21

## 2017-02-27 RX ADMIN — FAMOTIDINE 20 MG: 10 INJECTION, SOLUTION INTRAVENOUS at 11:23

## 2017-02-27 RX ADMIN — FAMOTIDINE 20 MG: 10 INJECTION, SOLUTION INTRAVENOUS at 23:25

## 2017-02-27 RX ADMIN — ONDANSETRON 4 MG: 2 INJECTION INTRAMUSCULAR; INTRAVENOUS at 01:21

## 2017-02-27 RX ADMIN — IBUPROFEN 800 MG: 400 TABLET ORAL at 06:50

## 2017-02-27 RX ADMIN — Medication 125 ML/HR: at 06:31

## 2017-02-27 RX ADMIN — Medication 1 G/HR: at 17:25

## 2017-02-27 NOTE — PROGRESS NOTES
0710 Bedside and verbal report received from [id number] [place] [id number] RN. Assumed care of patient. 2253 Dr Roque Matta at patient bedside for assessment. POC discussed. 1110 Message sent to GILL Lima via [facility]. Prompt return call received. Report given to CNM about patient unable to keep fluids down, pain in chest when drinking and epigastric pain. Orders received for IV medications. Virgen Emerson RN notified CNM of critical Mag level of 8.0. Order received to modify Mag infusion to 1g/h.    1526 Page sent to [facility] via "[employer] with Mag level result decreasing to 7.7.    0863 Page sent to DEN Savage CNM via [facility]. Prompt return call received. Report given to CNM using SBAR and Mag level of 6.7 reported. Order received to leave Mag on 1g/h and redraw Mag level in 3 hours. 1910 Bedside and verbal report given to Miguelina Henriquez RN. Care of patient relinquished at this time.

## 2017-02-27 NOTE — PROGRESS NOTES
INITIAL NUTRITION ASSESSMENT     REASON FOR ASSESSMENT:   [x]  RN Referral:    [] MST score >/=2  Malnutrition Screening Tool (MST):  Recently Lost Weight Without Trying: No     Eating Poorly Due to Decreased Appetite: No  MST Score: 0    [] Enteral/Parenteral Nutrition PTA   [x] Pregnant (Not in Labor):  [] Gestational DM     [x] Multigestation   [] Pressure Ulcer/Wound Care needs    NUTRITION ASSESSMENT:   Client History: 28 yrs old Female admitted with single pregnancy, not multigestational pregnancy. Nutrition best practice alert triggered in error. Please consult if dietitian services are indicated and desired.  Thank you.    [] Participated in Interdisciplinary Rounds  [x] [facility] Reviewed/Documented  [] Discharge Nutrition Plan     Carlos Campbell, [address]  PAGER:  [phone]

## 2017-02-27 NOTE — LACTATION NOTE
Mom currently on magnesium and per mom, she is not feeling well enough to breastfeed or to pump right now. Discussed supply and demand. Will page when ready to breastfeed.

## 2017-02-27 NOTE — L&D DELIVERY NOTE
Delivery Note    Obstetrician:  Masha Deras CNM    Assistant: none    Pre-Delivery Diagnosis: Term pregnancy, Spontaneous labor or Single fetus    Post-Delivery Diagnosis: Living  infant(s) or Male    Intrapartum Event: None    Procedure: Spontaneous vaginal delivery    Epidural: YES    Monitor:  Fetal Heart Tones - External and Uterine Contractions - External    Indications for instrumental delivery: none    Estimated Blood Loss: 250    Episiotomy: none    Laceration(s):  none    Laceration(s) repair: NO    Presentation: Cephalic    Fetal Description: felix    Fetal Position: Occiput Anterior    Birth Weight: 7lbs 4oz    Birth Length: 20    Apgar - One Minute: 9    Apgar - Five Minutes: 9    Umbilical Cord: 3 vessels present and Cord blood sent to lab for type, Rh, and Ismael' test    Specimens: placenta to lab           Complications:  Pre-eclampsia           Cord Blood Results:   Information for the patient's :  Leticia Margarita RAVI [[id number]]   No results found for: PCTABR, PCTDIG, BILI, ABORH    Prenatal Labs:     Lab Results   Component Value Date/Time    HBsAg, External negative 2016    HIV, External negative 2016    Rubella, External immune 2016    RPR, External NR 2016    Gonorrhea, External negative 2016    Chlamydia, External negative 2016    GrBStrep, External negative 2017        Attending Attestation: I was present and scrubbed for the entire procedure    Signed By:  Masha Deras CNM     2017

## 2017-02-27 NOTE — PROGRESS NOTES
Labor Progress Note  Patient reports severe epigastric pain. Repeat [facility] labs collecyed  No data found. Physical Exam:  Cervical Exam:  6 cm dilated    90% effaced    0 station    Presenting Part: cephalic  Membranes:  Artificial Rupture of Membranes; Amniotic Fluid: scant of clear fluid  Uterine Activity: Frequency: Every 2-3 minutes, Duration: 70 seconds and Intensity: moderate  Fetal Heart Rate: Baseline: 110 per minute  Variability: moderate  Accelerations: yes  Decelerations: none    Assessment/Plan:  Continues to report severe epigastric pain. Dr. Dayan Macdonald consulted, Repeat [facility] labs ordered. MsSo4 ordered.

## 2017-02-27 NOTE — PROGRESS NOTES
Labor Progress Note  Patient seen, fetal heart rate and contraction pattern evaluated, patient examined. No data found.       Physical Exam:  Cervical Exam:  9 cm dilated    90% effaced    +1 station    Presenting Part: cephalic  Membranes:  no fluid noted  Uterine Activity: Frequency: Every 2 minutes, Duration: 60 seconds and Intensity: moderate  Fetal Heart Rate: Baseline: 110 per minute  Variability: minimal  Accelerations: no  Decelerations: none    Assessment/Plan:  Reassuring fetal status, Labor  Progressing normally, Continue plan for vaginal delivery

## 2017-02-27 NOTE — PROGRESS NOTES
Bedside and verbal report received from ROBERT Morin RN via [employer], and [facility]. Assumed care of pt at this time. Pt resting in bed watching televison. POV reviewed with pt. Pt verbalized understanding. Will continue to monitor pt.    : Consents signed. : PIV placed to L hand. LR infusing    : Head to toe assessment performed    : 20mg Pepcid given for indigestion    : Pt states pepcid has not helped with the pain in her chest. ANOOP Alvarenga notified. Order given for IV tylenol    : 2 Radha-Units of Pitocin started. Strip reactive and reassuring. : 1000 mg tylenol given for back and chest pain. : Pt up to bathroom to void     : Pt requesting epidural. LR bolus started    : Dr. Yen Mooney paged through [facility]. : Dr. Yen Mooney on unit for epidural    : Dr. Yen Mooney at bedside. Pt sitting up for epidural    : Time out performed    : Epidural catheter placed. Test dose administered    : Loading dose administered. Fentanyl given to Dr. Yen Mooney. 2208: Pt laying back down in semifowlers position    2210: Ferguson placed    2215: ANOOP Villegas CNM at bedside. AROM performed. Scant amount of clear fluid noted. 2245: ANOOP Villegas CNM on unit reviewing POC. VO to start Mag sulfate.    2301: 4g Mag sulfate bolus started. 2315: Urometer placed on ferguson. 100ml found in previous bag.    2324: Mag sulfate maintenance started    0030: SVE: 7/100/0 by ANOOP Villegas CNM    0121: 4mg Zofran given for nausea    0240: Pt turned to R lateral position. Peanut ball between legs    0430: SVE: 8/90/0.     0500: ANOOP Villegas CNM at bedside to assess pt. SVE: 9/90/+1    0533: SVE: complete/100/+1. Ferguson removed    0536: Beginning to push at this time.    0601:  of viable baby boy at this time. Baby placed on mothers abdomen for bonding. Cord clamped by ANOOP Villegas CNM and cut by MANDY.  Baby immediately taken to radiant warmer for further evaluation by ALLAN    0268:  of placenta at this time. Pitocin infusing    0615: Pericare performed. Pad and ice pack placed on perineum. 5400: Epidural catheter removed. Cath tip intact. Pt tolerated well.     6854: 800mg Motrin given for 8/10 pain

## 2017-02-27 NOTE — H&P
History & Physical    Name: Meta Kanner MRN: [id number]  SSN: [id number]    YOB: 1990  Age: 28 y.o. Sex: female        Subjective:     Estimated Date of Delivery: 3/7/17  OB History    Para Term  AB SAB TAB Ectopic Multiple Living   2 1 1       1      # Outcome Date GA Lbr Ankush/2nd Weight Sex Delivery Anes PTL Lv   2 Current            1 Term 2013    M Vag-Spont             Ms. Dali Jorge is admitted with pregnancy at 38w5d for elevated liver enzymes, epigastric pain, favorable cervix at term for delivery. Prenatal course was complicated by seizure disorder controlled by Lamictal. Please see prenatal records for details. Past Medical History:   Diagnosis Date    Anemia     Anxiety disorder     no current medications, paxil before pregnancy    Depression     Epilepsy ([facility] [address]     taking lamictal    Migraine      History reviewed. No pertinent surgical history. Social History     Occupational History    Not on file. Social History Main Topics    Smoking status: Former Smoker    Smokeless tobacco: Not on file    Alcohol use No    Drug use: No    Sexual activity: Not on file     History reviewed. No pertinent family history. No Known Allergies  Prior to Admission medications    Medication Sig Start Date End Date Taking? Authorizing Provider   lamoTRIgine (LAMICTAL) 100 mg tablet Take 100 mg by mouth daily. Yes Historical Provider   folic acid (FOLVITE) 1 mg tablet Take  by mouth daily. Yes Historical Provider   famotidine (PEPCID) 20 mg tablet Take 20 mg by mouth two (2) times a day. Indications: HEARTBURN   Yes Historical Provider   lamoTRIgine (LAMICTAL) 200 mg tablet Take  by mouth two (2) times a day.    Yes Historical Provider        Review of Systems: Gastrointestinal: positive for reflux symptoms and epigastric pain    Objective:     Vitals:  Vitals:    17 1848 17 1853 17 1911 17 191   BP:       Pulse:       SpO2: 100% 100% 100% 100% Weight:       Height:            Physical Exam:  Patient without distress. Cervical Exam: 5/80/0 dilated    Membranes:  Intact  Fetal Heart Rate: Reactive    Prenatal Labs:   Lab Results   Component Value Date/Time    Rubella, External immune 07/20/2016    GrBStrep, External negative 01/31/2017    HBsAg, External negative 07/20/2016    HIV, External negative 07/20/2016    RPR, External NR 07/20/2016    Gonorrhea, External negative 07/20/2016    Chlamydia, External negative 07/20/2016    ABO,Rh A+ 07/20/2016         Assessment/Plan:     Active Problems:    * No active hospital problems. *       Plan: Admit for Reassuring fetal status, Dr. Kate Cabrera notified of lab results and discussed POC. Will admit for IOL and monitor closely. Group B Strep was negative.     Signed By:  Harriett Medeiros CNM     February 26, 2017

## 2017-02-27 NOTE — ANESTHESIA PROCEDURE NOTES
Epidural Block    Start time: 2/26/2017 9:59 PM  End time: 2/26/2017 10:03 PM  Performed by: Joann Lucio by: Kathy Madrid     Pre-Procedure  Indication: labor epidural    Preanesthetic Checklist: patient identified, risks and benefits discussed, anesthesia consent, site marked, patient being monitored, timeout performed and anesthesia consent    Timeout Time: 21:59        Epidural:   Patient position:  Seated  Prep region:  Lumbar  Prep: Patient draped and Chlorhexidine    Location:  L3-4    Needle and Epidural Catheter:   Needle Type:  Tuohy  Needle Gauge:  17 G  Injection Technique:  Loss of resistance using air  Attempts:  1  Catheter Size:  20 G  Catheter at Skin Depth (cm):  5  Depth in Epidural Space (cm):  5  Events: no blood with aspiration, no cerebrospinal fluid with aspiration, no paresthesia and negative aspiration test    Test Dose:  Negative and lidocaine 1.0% w/ epi    Assessment:   Catheter Secured:  Tegaderm and tape  Insertion:  Uncomplicated  Patient tolerance:  Patient tolerated the procedure well with no immediate complications  Risks and benefits of epidural include but not limited to a headache (with risk of repair requiring blood patch), backache, bleeding, infection, nerve injury, paralysis, failure with need to replace, aand hemodynamic changes. Also, patient was very uncomfortable with epigastric pain. I had her sit up only when I was completely ready for procedure. Obstetrics  Epidural Catheter for labor analgesia requested by ob  : Tess Farrell MD    Time start:  Procedure end:      MRN:  [id number]  CSN:  [id number]    Risks, benefits, and alternatives discussed. Risks including, but not limited to, bleeding, headache, backache, nerve injury, paralysis and failure and possible replacement. Time out performed, correct patient and site identified. Standard monitors applied. Patient placed in sitting position, back prepped and draped with Chlorhexidine. 1% lidocaine skin wheal, 17g Tuohy needle placed at 4th lumbar vertebrae level. + KRISTEN obtained, no heme, no paresthesia or CSF noted. Epidural space encountered at 5cm. Epidural catheter placed via Tuohy needle and secured at 10 cm at skin. Negative test dose with 3 mLs 1.5% lidocaine w/1:200,000 epinephrine. Patient tolerated the procedure well, VSS throughout, no apparent complications. 2 mLs 1.5% lidocaine w/1:200,000 epinephrine added to catheter followed by Fentanyl 100 mcg. 0.2% ropivicaine 5 cc injected into catheter after negative aspiration. Patient connected to pump.      Eliud Mehta MD   DATE:  2/26/2017  10:20 PM

## 2017-02-28 LAB
ALBUMIN SERPL BCP-MCNC: 1.7 G/DL (ref 3.4–5)
ALBUMIN/GLOB SERPL: 0.5 {RATIO} (ref 0.8–1.7)
ALP SERPL-CCNC: 199 U/L (ref 45–117)
ALT SERPL-CCNC: 26 U/L (ref 13–56)
ANION GAP BLD CALC-SCNC: 11 MMOL/L (ref 3–18)
AST SERPL W P-5'-P-CCNC: 29 U/L (ref 15–37)
BASOPHILS # BLD AUTO: 0 K/UL (ref 0–0.1)
BASOPHILS # BLD: 0 % (ref 0–3)
BILIRUB SERPL-MCNC: 0.1 MG/DL (ref 0.2–1)
BUN SERPL-MCNC: 20 MG/DL (ref 7–18)
BUN/CREAT SERPL: 13 (ref 12–20)
CALCIUM SERPL-MCNC: 7.4 MG/DL (ref 8.5–10.1)
CHLORIDE SERPL-SCNC: 109 MMOL/L (ref 100–108)
CO2 SERPL-SCNC: 20 MMOL/L (ref 21–32)
CREAT SERPL-MCNC: 1.53 MG/DL (ref 0.6–1.3)
DIFFERENTIAL METHOD BLD: ABNORMAL
EOSINOPHIL # BLD: 0 K/UL (ref 0–0.4)
EOSINOPHIL NFR BLD: 0 % (ref 0–5)
ERYTHROCYTE [DISTWIDTH] IN BLOOD BY AUTOMATED COUNT: 13.5 % (ref 11.6–14.5)
GLOBULIN SER CALC-MCNC: 3.4 G/DL (ref 2–4)
GLUCOSE SERPL-MCNC: 70 MG/DL (ref 74–99)
HCT VFR BLD AUTO: 24.8 % (ref 35–45)
HGB BLD-MCNC: 8.5 G/DL (ref 12–16)
LYMPHOCYTES # BLD AUTO: 16 % (ref 20–51)
LYMPHOCYTES # BLD: 3 K/UL (ref 0.8–3.5)
MAGNESIUM SERPL-MCNC: 6 MG/DL (ref 1.8–2.4)
MAGNESIUM SERPL-MCNC: 7.1 MG/DL (ref 1.8–2.4)
MCH RBC QN AUTO: 31 PG (ref 24–34)
MCHC RBC AUTO-ENTMCNC: 34.3 G/DL (ref 31–37)
MCV RBC AUTO: 90.5 FL (ref 74–97)
MONOCYTES # BLD: 0.6 K/UL (ref 0–1)
MONOCYTES NFR BLD AUTO: 3 % (ref 2–9)
NEUTS BAND NFR BLD MANUAL: 4 % (ref 0–5)
NEUTS SEG # BLD: 14.2 K/UL (ref 1.8–8)
NEUTS SEG NFR BLD AUTO: 77 % (ref 42–75)
PLATELET # BLD AUTO: 252 K/UL (ref 135–420)
PMV BLD AUTO: 9.9 FL (ref 9.2–11.8)
POTASSIUM SERPL-SCNC: 3.5 MMOL/L (ref 3.5–5.5)
PROT SERPL-MCNC: 5.1 G/DL (ref 6.4–8.2)
RBC # BLD AUTO: 2.74 M/UL (ref 4.2–5.3)
RBC MORPH BLD: ABNORMAL
RBC MORPH BLD: ABNORMAL
SODIUM SERPL-SCNC: 140 MMOL/L (ref 136–145)
WBC # BLD AUTO: 18.5 K/UL (ref 4.6–13.2)
WBC MORPH BLD: ABNORMAL

## 2017-02-28 PROCEDURE — 74011250636 HC RX REV CODE- 250/636: Performed by: NURSE PRACTITIONER

## 2017-02-28 PROCEDURE — 74011000250 HC RX REV CODE- 250: Performed by: NURSE PRACTITIONER

## 2017-02-28 PROCEDURE — 74011250636 HC RX REV CODE- 250/636: Performed by: ADVANCED PRACTICE MIDWIFE

## 2017-02-28 PROCEDURE — 74011250637 HC RX REV CODE- 250/637: Performed by: OBSTETRICS & GYNECOLOGY

## 2017-02-28 PROCEDURE — 80053 COMPREHEN METABOLIC PANEL: CPT | Performed by: ADVANCED PRACTICE MIDWIFE

## 2017-02-28 PROCEDURE — 74011250637 HC RX REV CODE- 250/637: Performed by: ADVANCED PRACTICE MIDWIFE

## 2017-02-28 PROCEDURE — 65270000029 HC RM PRIVATE

## 2017-02-28 PROCEDURE — 83735 ASSAY OF MAGNESIUM: CPT | Performed by: ADVANCED PRACTICE MIDWIFE

## 2017-02-28 PROCEDURE — 83735 ASSAY OF MAGNESIUM: CPT | Performed by: NURSE PRACTITIONER

## 2017-02-28 PROCEDURE — 85025 COMPLETE CBC W/AUTO DIFF WBC: CPT | Performed by: ADVANCED PRACTICE MIDWIFE

## 2017-02-28 PROCEDURE — 36415 COLL VENOUS BLD VENIPUNCTURE: CPT | Performed by: ADVANCED PRACTICE MIDWIFE

## 2017-02-28 PROCEDURE — 74011250637 HC RX REV CODE- 250/637: Performed by: NURSE PRACTITIONER

## 2017-02-28 RX ORDER — FAMOTIDINE 20 MG/1
20 TABLET, FILM COATED ORAL 2 TIMES DAILY
Status: DISCONTINUED | OUTPATIENT
Start: 2017-02-28 | End: 2017-03-01 | Stop reason: HOSPADM

## 2017-02-28 RX ORDER — BUTALBITAL, ACETAMINOPHEN AND CAFFEINE 50; 325; 40 MG/1; MG/1; MG/1
1 TABLET ORAL
Status: DISCONTINUED | OUTPATIENT
Start: 2017-02-28 | End: 2017-03-01 | Stop reason: HOSPADM

## 2017-02-28 RX ORDER — FAMOTIDINE 20 MG/1
20 TABLET, FILM COATED ORAL 2 TIMES DAILY
Status: DISCONTINUED | OUTPATIENT
Start: 2017-02-28 | End: 2017-02-28 | Stop reason: SDUPTHER

## 2017-02-28 RX ORDER — LAMOTRIGINE 100 MG/1
300 TABLET ORAL 2 TIMES DAILY
Status: DISCONTINUED | OUTPATIENT
Start: 2017-02-28 | End: 2017-03-01 | Stop reason: HOSPADM

## 2017-02-28 RX ORDER — ONDANSETRON 4 MG/1
4 TABLET, FILM COATED ORAL
Status: DISCONTINUED | OUTPATIENT
Start: 2017-02-28 | End: 2017-03-01 | Stop reason: HOSPADM

## 2017-02-28 RX ADMIN — LAMOTRIGINE 300 MG: 100 TABLET ORAL at 21:04

## 2017-02-28 RX ADMIN — IBUPROFEN 800 MG: 400 TABLET ORAL at 19:37

## 2017-02-28 RX ADMIN — KETOROLAC TROMETHAMINE 30 MG: 30 INJECTION, SOLUTION INTRAMUSCULAR at 05:08

## 2017-02-28 RX ADMIN — FAMOTIDINE 20 MG: 20 TABLET, FILM COATED ORAL at 11:47

## 2017-02-28 RX ADMIN — OXYCODONE HYDROCHLORIDE AND ACETAMINOPHEN 1 TABLET: 5; 325 TABLET ORAL at 23:29

## 2017-02-28 RX ADMIN — IBUPROFEN 800 MG: 400 TABLET ORAL at 11:46

## 2017-02-28 RX ADMIN — ONDANSETRON 4 MG: 2 INJECTION INTRAMUSCULAR; INTRAVENOUS at 09:27

## 2017-02-28 RX ADMIN — ONDANSETRON HYDROCHLORIDE 4 MG: 4 TABLET, FILM COATED ORAL at 19:37

## 2017-02-28 RX ADMIN — LAMOTRIGINE 300 MG: 100 TABLET ORAL at 11:47

## 2017-02-28 RX ADMIN — SODIUM CHLORIDE, SODIUM LACTATE, POTASSIUM CHLORIDE, AND CALCIUM CHLORIDE 75 ML/HR: 600; 310; 30; 20 INJECTION, SOLUTION INTRAVENOUS at 00:13

## 2017-02-28 RX ADMIN — ONDANSETRON HYDROCHLORIDE 4 MG: 4 TABLET, FILM COATED ORAL at 13:25

## 2017-02-28 RX ADMIN — ACETAMINOPHEN 650 MG: 325 TABLET, FILM COATED ORAL at 10:13

## 2017-02-28 RX ADMIN — Medication 1 G/HR: at 00:13

## 2017-02-28 RX ADMIN — FAMOTIDINE 20 MG: 20 TABLET, FILM COATED ORAL at 21:04

## 2017-02-28 RX ADMIN — BUTALBITAL, ACETAMINOPHEN AND CAFFEINE 1 TABLET: 50; 325; 40 TABLET ORAL at 15:10

## 2017-02-28 NOTE — PROGRESS NOTES
Pt c/o of severe headache. States she has migraines-asked for percocet but after scanning and handing pt medication she changed her mind. Ambulated to bathroom. Voided 400 ml without diff. 1300-  Pt asking if she can get an order for fioricet. Paged  NMW.

## 2017-02-28 NOTE — PROGRESS NOTES
1910: Verbal SBAR report received from ASTON Franklin RN. Assumed care of pt at this time. 1930: assessment completed. Pt stable. No needs at this time. 2127: Mag level noted to be 6.9. LITO savage CNM made aware. Orders received to redraw mag level in 6hrs.   0300: lab at bedside for lab draw  0407: Lab called mag level noted to be 7.1. LITO Savage CNM on unit and made aware. Orders received to add on a CBC and CMP to lab work. 0500: lab work reviewed by [employer]. Stated she may have a light breakfast this morning.   0600: Magnesium discontinued and ferguson cath discontinued as well. Pt does not want to get up to bathroom at this time. She stated she wanted to lay back down for a while. 0715: Verbal SBAR report given to Oli Louie RN. Relinuqished care of pt at this time.

## 2017-02-28 NOTE — ROUTINE PROCESS
6121:  Report given to GILL Thompson of patient's Magnesium level at 0407 of 7.1. Order received to continue to obtain Magnesium level at 0800. order placed for magnesium lab to be drawn at 0800 and lab notified of need for repeat level at that time.

## 2017-02-28 NOTE — PROGRESS NOTES
Progress Note    Patient: Nisha Barreto MRN: [id number]     YOB: 1990  Age: 28 y.o. Subjective:     Postpartum Day: 1    The patient is feeling well. Pain is  well controlled with current medications. Baby is feeding via bottle without difficulty. Urinary output is adequate.      Objective:      Patient Vitals for the past 8 hrs:   BP Temp Pulse Resp   02/28/17 0732 123/80 98 °F (36.7 °C) 79 16   02/28/17 0601 119/80 - 78 16   02/28/17 0500 123/89 - 83 16   02/28/17 0400 137/89 - 74 16   02/28/17 0301 131/89 - 87 18   02/28/17 0200 120/78 - 76 16   02/28/17 0100 123/79 - 78 16   02/28/17 0001 117/87 - 85 16     General:    alert   Lochia:  appropriate   Uterine Fundus:   firm @ umbilicus    Perineum:  Intact    DVT Evaluation:  No evidence of DVT seen on physical exam.     Lab/Data Review:  Recent Results (from the past 24 hour(s))   MAGNESIUM    Collection Time: 02/27/17 11:20 AM   Result Value Ref Range    Magnesium 8.0 (HH) 1.8 - 2.4 mg/dL   MAGNESIUM    Collection Time: 02/27/17  2:20 PM   Result Value Ref Range    Magnesium 7.7 (HH) 1.8 - 2.4 mg/dL   MAGNESIUM    Collection Time: 02/27/17  6:03 PM   Result Value Ref Range    Magnesium 6.7 (HH) 1.8 - 2.4 mg/dL   CBC W/O DIFF    Collection Time: 02/27/17  6:03 PM   Result Value Ref Range    WBC 19.2 (H) 4.6 - 13.2 K/uL    RBC 2.88 (L) 4.20 - 5.30 M/uL    HGB 9.0 (L) 12.0 - 16.0 g/dL    HCT 25.9 (L) 35.0 - 45.0 %    MCV 89.9 74.0 - 97.0 FL    MCH 31.3 24.0 - 34.0 PG    MCHC 34.7 31.0 - 37.0 g/dL    RDW 13.3 11.6 - 14.5 %    PLATELET 112 452 - 891 K/uL    MPV 9.7 9.2 - 80.2 FL   METABOLIC PANEL, BASIC    Collection Time: 02/27/17  6:03 PM   Result Value Ref Range    Sodium 138 136 - 145 mmol/L    Potassium 3.7 3.5 - 5.5 mmol/L    Chloride 107 100 - 108 mmol/L    CO2 19 (L) 21 - 32 mmol/L    Anion gap 12 3.0 - 18 mmol/L    Glucose 80 74 - 99 mg/dL    BUN 19 (H) 7.0 - 18 MG/DL    Creatinine 1.65 (H) 0.6 - 1.3 MG/DL    BUN/Creatinine ratio 12 12 - 20      GFR est AA 45 (L) >60 ml/min/1.73m2    GFR est non-AA 37 (L) >60 ml/min/1.73m2    Calcium 7.7 (L) 8.5 - 10.1 MG/DL   HEPATIC FUNCTION PANEL    Collection Time: 02/27/17  6:03 PM   Result Value Ref Range    Protein, total 5.3 (L) 6.4 - 8.2 g/dL    Albumin 1.7 (L) 3.4 - 5.0 g/dL    Globulin 3.6 2.0 - 4.0 g/dL    A-G Ratio 0.5 (L) 0.8 - 1.7      Bilirubin, total 0.2 0.2 - 1.0 MG/DL    Bilirubin, direct 0.1 0.0 - 0.2 MG/DL    Alk. phosphatase 212 (H) 45 - 117 U/L    AST (SGOT) 34 15 - 37 U/L    ALT (SGPT) 29 13 - 56 U/L   URIC ACID    Collection Time: 02/27/17  6:03 PM   Result Value Ref Range    Uric acid 8.1 (H) 2.6 - 7.2 MG/DL   MAGNESIUM    Collection Time: 02/27/17  8:55 PM   Result Value Ref Range    Magnesium 6.9 (HH) 1.8 - 2.4 mg/dL   MAGNESIUM    Collection Time: 02/28/17  3:05 AM   Result Value Ref Range    Magnesium 7.1 (HH) 1.8 - 2.4 mg/dL   CBC WITH AUTOMATED DIFF    Collection Time: 02/28/17  3:05 AM   Result Value Ref Range    WBC 18.5 (H) 4.6 - 13.2 K/uL    RBC 2.74 (L) 4.20 - 5.30 M/uL    HGB 8.5 (L) 12.0 - 16.0 g/dL    HCT 24.8 (L) 35.0 - 45.0 %    MCV 90.5 74.0 - 97.0 FL    MCH 31.0 24.0 - 34.0 PG    MCHC 34.3 31.0 - 37.0 g/dL    RDW 13.5 11.6 - 14.5 %    PLATELET 788 195 - 621 K/uL    MPV 9.9 9.2 - 11.8 FL    NEUTROPHILS 77 (H) 42 - 75 %    BAND NEUTROPHILS 4 0 - 5 %    LYMPHOCYTES 16 (L) 20 - 51 %    MONOCYTES 3 2 - 9 %    EOSINOPHILS 0 0 - 5 %    BASOPHILS 0 0 - 3 %    ABS. NEUTROPHILS 14.2 (H) 1.8 - 8.0 K/UL    ABS. LYMPHOCYTES 3.0 0.8 - 3.5 K/UL    ABS. MONOCYTES 0.6 0 - 1.0 K/UL    ABS. EOSINOPHILS 0.0 0.0 - 0.4 K/UL    ABS.  BASOPHILS 0.0 0.0 - 0.1 K/UL    RBC COMMENTS ANISOCYTOSIS  1+        RBC COMMENTS MICROCYTOSIS  1+        WBC COMMENTS REACTIVE LYMPHS      DF MANUAL     METABOLIC PANEL, COMPREHENSIVE    Collection Time: 02/28/17  3:05 AM   Result Value Ref Range    Sodium 140 136 - 145 mmol/L    Potassium 3.5 3.5 - 5.5 mmol/L    Chloride 109 (H) 100 - 108 mmol/L    CO2 20 (L) 21 - 32 mmol/L    Anion gap 11 3.0 - 18 mmol/L    Glucose 70 (L) 74 - 99 mg/dL    BUN 20 (H) 7.0 - 18 MG/DL    Creatinine 1.53 (H) 0.6 - 1.3 MG/DL    BUN/Creatinine ratio 13 12 - 20      GFR est AA 49 (L) >60 ml/min/1.73m2    GFR est non-AA 40 (L) >60 ml/min/1.73m2    Calcium 7.4 (L) 8.5 - 10.1 MG/DL    Bilirubin, total 0.1 (L) 0.2 - 1.0 MG/DL    ALT (SGPT) 26 13 - 56 U/L    AST (SGOT) 29 15 - 37 U/L    Alk. phosphatase 199 (H) 45 - 117 U/L    Protein, total 5.1 (L) 6.4 - 8.2 g/dL    Albumin 1.7 (L) 3.4 - 5.0 g/dL    Globulin 3.4 2.0 - 4.0 g/dL    A-G Ratio 0.5 (L) 0.8 - 1.7       All lab results for the last 24 hours reviewed. Assessment:     Delivery:     Plan:     Postpartum vaginal delivery complicated by severe pre-eclampsia. . Continue current postpartum care. Encouraged hydration, nutrition and ambulation. May consider d/c tomorrow if stable. Current Discharge Medication List      CONTINUE these medications which have NOT CHANGED    Details   !! lamoTRIgine (LAMICTAL) 100 mg tablet Take 100 mg by mouth daily. folic acid (FOLVITE) 1 mg tablet Take  by mouth daily. famotidine (PEPCID) 20 mg tablet Take 20 mg by mouth two (2) times a day. Indications: HEARTBURN      !! lamoTRIgine (LAMICTAL) 200 mg tablet Take  by mouth two (2) times a day. !! - Potential duplicate medications found. Please discuss with provider.           Signed By: Kameron Jimenez NP     2017

## 2017-02-28 NOTE — PROGRESS NOTES
Called Alex Veras CNM, in Labor and Delivery. Patient requested [employer] [place] for a headache as she takes that at home. Angi Huber will put an order in for her.

## 2017-02-28 NOTE — ROUTINE PROCESS
TRANSFER - IN REPORT:    Verbal report received from Promise Elizondo (name) on Husam Arroyo  being received from [facility] (unit) for routine progression of care      Report consisted of patients Situation, Background, Assessment and   Recommendations(SBAR). Information from the following report(s) SBAR, Intake/Output, MAR and Recent Results was reviewed with the receiving nurse. Opportunity for questions and clarification was provided. Assessment completed upon patients arrival to unit and care assumed. Pt c/o of headache and chest discomfort upon arrival.  ? Due to indigestion. Patience Riding into see pt. Oriented to room and unit. Tylenol given for headache.

## 2017-02-28 NOTE — ROUTINE PROCESS
Bedside shift change report given to MARIA DEL ROSARIO Mosquera (oncoming nurse) by Beena Adorno (offgoing nurse). Report included the following information SBAR, ED Summary, OR Summary, Intake/Output, MAR and Recent Results.

## 2017-02-28 NOTE — LACTATION NOTE
Per nursing staff, mom does want to see [address] [facility], just not now. Staff to inform mom to page [facility] when ready to see her.

## 2017-02-28 NOTE — PROGRESS NOTES
0154 Verbal report received from MARILU Barrera.    6003 Patient resting, denies needs at this time. 0840 patient eating breatfast    0177 patient states she had back pain but did not want percocet for pain. 7909 patient assisted up to restroom, able to void    4393 TRANSFER - OUT REPORT:    Verbal report given to GILL Mohan LPN (name) on Husam Arroyo  being transferred to mother/baby (unit) for routine progression of care       Report consisted of patients Situation, Background, Assessment and   Recommendations(SBAR). Information from the following report(s) SBAR, Procedure Summary, Intake/Output and MAR was reviewed with the receiving nurse. Lines:   Peripheral IV 02/26/17 Left Hand (Active)   Site Assessment Clean, dry, & intact 2/28/2017  7:39 AM   Phlebitis Assessment 0 2/28/2017  7:39 AM   Infiltration Assessment 0 2/28/2017  7:39 AM   Dressing Status Clean, dry, & intact 2/28/2017  7:39 AM   Dressing Type Tape;Transparent 2/28/2017  7:39 AM   Hub Color/Line Status Pink 2/28/2017  7:39 AM   Alcohol Cap Used Yes 2/28/2017  7:39 AM        Opportunity for questions and clarification was provided.       Patient transported with:   Registered Nurse

## 2017-02-28 NOTE — ANESTHESIA POSTPROCEDURE EVALUATION
2/28/2017  3:03 PM    Laboring Epidural Follow-up Note     Referring physician: Hugh Beebe,*   Patient status post vaginal delivery with labor epidural    Visit Vitals    /86    Pulse 66    Temp 36.8 °C (98.2 °F)    Resp 18    Ht 5' 6\" (1.676 m)    Wt 68 kg (150 lb)    SpO2 100%    Breastfeeding Yes    BMI 24.21 kg/m2       Epidural removed by L&D staff  Patient reports adequate analgesia during delivery. Site clean/dry/intact; no swelling or bruising. No headache. Patient able to ambulate with full strength and able to urinate without complication. Geovanny Zhao CRNA

## 2017-03-01 VITALS
HEART RATE: 77 BPM | SYSTOLIC BLOOD PRESSURE: 128 MMHG | HEIGHT: 66 IN | RESPIRATION RATE: 18 BRPM | OXYGEN SATURATION: 99 % | TEMPERATURE: 98 F | WEIGHT: 150 LBS | DIASTOLIC BLOOD PRESSURE: 79 MMHG | BODY MASS INDEX: 24.11 KG/M2

## 2017-03-01 PROCEDURE — 74011250637 HC RX REV CODE- 250/637: Performed by: ADVANCED PRACTICE MIDWIFE

## 2017-03-01 PROCEDURE — 74011250637 HC RX REV CODE- 250/637: Performed by: NURSE PRACTITIONER

## 2017-03-01 RX ORDER — IBUPROFEN 800 MG/1
800 TABLET ORAL
Qty: 40 TAB | Refills: 0 | Status: SHIPPED | OUTPATIENT
Start: 2017-03-01

## 2017-03-01 RX ADMIN — ONDANSETRON HYDROCHLORIDE 4 MG: 4 TABLET, FILM COATED ORAL at 10:06

## 2017-03-01 RX ADMIN — IBUPROFEN 800 MG: 400 TABLET ORAL at 03:55

## 2017-03-01 NOTE — DISCHARGE INSTRUCTIONS
POST DELIVERY DISCHARGE INSTRUCTIONS    Name: Rocio Espinoza  YOB: 1990  Primary Diagnosis: Active Problems:    IUP (intrauterine pregnancy), incidental (2017)        General:     Diet/Diet Restrictions:  Eight 8-ounce glasses of fluid daily (water, juices); avoid excessive caffeine intake. Meals/snacks as desired which are high in fiber and carbohydrates and low in fat and cholesterol. Physical Activity / Restrictions / Safety:     Avoid heavy lifting, no more that 8 lbs. For 2-3 weeks; No driving for one week. Avoid intercourse 4-6 weeks, no douching or tampon use. Check with obstetrician before starting or resuming an exercise program.         Discharge Instructions/Special Treatment/Home Care Needs:     Continue prenatal vitamins. Continue to use squirt bottle with warm water on your episiotomy after each bathroom use until bleeding stops. Call your doctor for the following:     Fever over 101 degrees by mouth. Vaginal bleeding heavier than a normal menstrual period or clot larger than a golf ball. Red streaks or increased swelling of legs, painful red streaks on your breast.  Painful urination, constipation and increased pain or swelling or discharge with your incision. If you feel extremely anxious or overwhelmed. If you have thoughts of harming yourself and/or your baby. Pain Management:     Pain Management:   Take Acetaminophen (Tylenol) or Ibuprofen (Advil, Motrin), as directed for pain. Use a warm Sitz bath 3 times daily to relieve episiotomy or hemorrhoidal discomfort. Heating pad to  incision as needed. For hemorrhoidal discomfort, use Tucks and Anusol cream as needed and directed. Follow-Up Care:     Call [facility]. to make appointment for follow up in 3-5 days.       These are general instructions for a healthy lifestyle:    No smoking/ No tobacco products/ Avoid exposure to second hand smoke    Surgeon General's Warning:  Quitting smoking now greatly reduces serious risk to your health. Obesity, smoking, and sedentary lifestyle greatly increases your risk for illness    A healthy diet, regular physical exercise & weight monitoring are important for maintaining a healthy lifestyle    Recognize signs and symptoms of STROKE:    F-face looks uneven    A-arms unable to move or move unevenly    S-speech slurred or non-existent    T-time-call 911 as soon as signs and symptoms begin-DO NOT go       Back to bed or wait to see if you get better-TIME IS BRAIN. PLEASE CALL YOUR PHYSICIAN TO SCHEDULE YOUR FOLLOW UP APPOINTMENT     MyChart Activation    Thank you for requesting access to [employer]. Please follow the instructions below to securely access and download your online medical record. [employer] allows you to send messages to your doctor, view your test results, renew your prescriptions, schedule appointments, and more. How Do I Sign Up? 1. In your internet browser, go to [email or web address]. 2. Click on the First Time User? Click Here link in the Sign In box. You will see the New Member Sign Up page. 3. Enter your [employer] Access Code exactly as it appears below. You will not need to use this code after youve completed the sign-up process. If you do not sign up before the expiration date, you must request a new code. [employer] Access Code: [email or web address]  Expires: 2017  7:30 PM (This is the date your [employer] access code will )    4. Enter the last four digits of your Social Security Number (xxxx) and Date of Birth (mm/dd/yyyy) as indicated and click Submit. You will be taken to the next sign-up page. 5. Create a [employer] ID. This will be your [employer] login ID and cannot be changed, so think of one that is secure and easy to remember. 6. Create a [employer] password. You can change your password at any time. 7. Enter your Password Reset Question and Answer. This can be used at a later time if you forget your password. 8. Enter your e-mail address. You will receive e-mail notification when new information is available in [address] 9. Click Sign Up. You can now view and download portions of your medical record. 10. Click the Download Summary menu link to download a portable copy of your medical information. Additional Information    If you have questions, please visit the Frequently Asked Questions section of the [employer] website at [email or web address] Remember, [employer] is NOT to be used for urgent needs. For medical emergencies, dial 911.

## 2017-03-01 NOTE — ROUTINE PROCESS
Bedside and Verbal shift change report given to Miroslava Hernandez RN  (oncoming nurse) by MARIA DEL ROSARIO Dominguez (offgoing nurse). Report included the following information SBAR, Kardex, Intake/Output, MAR and Recent Results.

## 2017-03-01 NOTE — PROGRESS NOTES
Discharged home in stable condition with baby in arms. All discharge teaching reviewed and all questions answered. Instructed to call OB to make appointment for 3-5 days for B/P check and 6 weeks follow up.

## 2017-03-01 NOTE — PROGRESS NOTES
Progress Note    Patient: Juan Ramon Bellamy MRN: [id number]     YOB: 1990  Age: 28 y.o. Subjective:     Postpartum Day: 2    The patient is feeling well. Pain is  well controlled with current medications. Baby is feeding via bottle without difficulty. Urinary output is adequate. Objective:      No data found. General:    alert   Lochia:  appropriate   Uterine Fundus:   firm @ umbilicus    Perineum:  Intact    DVT Evaluation:  No evidence of DVT seen on physical exam.     Lab/Data Review:  Recent Results (from the past 24 hour(s))   MAGNESIUM    Collection Time: 17  8:22 AM   Result Value Ref Range    Magnesium 6.0 (HH) 1.8 - 2.4 mg/dL     All lab results for the last 24 hours reviewed. Assessment:     Delivery:     Plan:     Spontaneous vaginal delivery complicated by severe pre-e. Pt complaining of upper back pain that is c/w muscle strain and also mod-severe indigestion both of which were present prior to delivery. Will consult GI and chiropractor. Continue current postpartum care. Encouraged hydration, nutrition and ambulation. DC home today when ready. Follow-up in office in 6 weeks. Call prn. Current Discharge Medication List      CONTINUE these medications which have NOT CHANGED    Details   !! lamoTRIgine (LAMICTAL) 100 mg tablet Take 100 mg by mouth daily. folic acid (FOLVITE) 1 mg tablet Take  by mouth daily. famotidine (PEPCID) 20 mg tablet Take 20 mg by mouth two (2) times a day. Indications: HEARTBURN      !! lamoTRIgine (LAMICTAL) 200 mg tablet Take  by mouth two (2) times a day. !! - Potential duplicate medications found. Please discuss with provider.           Signed By: Kameron Jimenez NP     2017

## 2017-03-01 NOTE — DISCHARGE SUMMARY
Obstetrical Discharge Summary     Name: Alexis Austin MRN: [id number]  SSN: [id number]    YOB: 1990  Age: 28 y.o. Sex: female      Admit Date: 2017    Discharge Date: 3/1/2017     Admitting Physician: Gold Griffin MD     Attending Physician:  Gold Griffin MD     * Admission Diagnoses: iup - abdominal pain  IUP (intrauterine pregnancy), incidental    * Discharge Diagnoses:   Information for the patient's :  Jovita Babcock [[id number]]   Delivery of a 3.28 kg male infant via Vaginal, Spontaneous Delivery on 2017 at 6:01 AM  by . Apgars were 8 and 9. Additional Diagnoses:   Hospital Problems as of 3/1/2017  Date Reviewed: 2017          Codes Class Noted - Resolved POA    IUP (intrauterine pregnancy), incidental ICD-10-CM: Z33.1  ICD-9-CM: V22.2  2017 - Present Unknown             Lab Results   Component Value Date/Time    Rubella, External immune 2016    GrBStrep, External negative 2017    ABO,Rh A+ 2016    There is no immunization history for the selected administration types on file for this patient. * Procedures: spontaneous vaginal delivery complicated by severe pre-e  * No surgery found *           * Discharge Condition: good    [place] [facility] Course: Normal hospital course following the delivery. * Disposition: Home    Discharge Medications:   Current Discharge Medication List      START taking these medications    Details   ibuprofen (MOTRIN) 800 mg tablet Take 1 Tab by mouth every eight (8) hours as needed. Qty: 40 Tab, Refills: 0         CONTINUE these medications which have NOT CHANGED    Details   !! lamoTRIgine (LAMICTAL) 100 mg tablet Take 100 mg by mouth daily. folic acid (FOLVITE) 1 mg tablet Take  by mouth daily. famotidine (PEPCID) 20 mg tablet Take 20 mg by mouth two (2) times a day. Indications: HEARTBURN      !! lamoTRIgine (LAMICTAL) 200 mg tablet Take  by mouth two (2) times a day.        !! - Potential duplicate medications found. Please discuss with provider. * Follow-up Care/Patient Instructions:   Activity: Activity as tolerated  Diet: Regular Diet  Wound Care: None needed    Follow-up Information     Follow up With Details Comments [address] [facility] 23  [phone]             Signed By:  Mary Chao NP     March 1, 2017

## 2020-07-20 ENCOUNTER — HOSPITAL ENCOUNTER (OUTPATIENT)
Dept: LAB | Age: 30
Discharge: HOME | End: 2020-07-20

## 2020-07-20 LAB — XX-LABCORP SPECIMEN COL,LCBCF: NORMAL

## 2020-07-20 PROCEDURE — 99001 SPECIMEN HANDLING PT-LAB: CPT

## 2020-08-28 ENCOUNTER — HOSPITAL ENCOUNTER (OUTPATIENT)
Dept: MRI IMAGING | Age: 30
Discharge: HOME | End: 2020-08-28
Attending: PSYCHIATRY & NEUROLOGY
Payer: MEDICAID

## 2020-08-28 DIAGNOSIS — G40.209 LOCALIZATION-RELATED PARTIAL EPILEPSY WITH COMPLEX PARTIAL SEIZURES (HCC): ICD-10-CM

## 2020-08-28 PROCEDURE — 70551 MRI BRAIN STEM W/O DYE: CPT

## 2021-07-23 ENCOUNTER — TRANSCRIBE ORDER (OUTPATIENT)
Dept: SCHEDULING | Age: 31
End: 2021-07-23

## 2021-07-23 DIAGNOSIS — D35.2 PITUITARY EPIDERMOID TUMOR (HCC): Primary | ICD-10-CM

## 2021-08-12 ENCOUNTER — HOSPITAL ENCOUNTER (OUTPATIENT)
Dept: MRI IMAGING | Age: 31
Discharge: HOME | End: 2021-08-12
Attending: PSYCHIATRY & NEUROLOGY
Payer: MEDICAID

## 2021-08-12 VITALS — BODY MASS INDEX: 22.27 KG/M2 | WEIGHT: 138 LBS

## 2021-08-12 DIAGNOSIS — D35.2 PITUITARY EPIDERMOID TUMOR (HCC): ICD-10-CM

## 2021-08-12 PROCEDURE — 70553 MRI BRAIN STEM W/O & W/DYE: CPT

## 2021-08-12 PROCEDURE — 74011250636 HC RX REV CODE- 250/636: Performed by: PSYCHIATRY & NEUROLOGY

## 2021-08-12 PROCEDURE — A9577 INJ MULTIHANCE: HCPCS | Performed by: PSYCHIATRY & NEUROLOGY

## 2021-08-12 RX ADMIN — GADOBENATE DIMEGLUMINE 7 ML: 529 INJECTION, SOLUTION INTRAVENOUS at 15:48
